# Patient Record
Sex: FEMALE | Race: WHITE | NOT HISPANIC OR LATINO
[De-identification: names, ages, dates, MRNs, and addresses within clinical notes are randomized per-mention and may not be internally consistent; named-entity substitution may affect disease eponyms.]

---

## 2017-04-04 ENCOUNTER — TRANSCRIPTION ENCOUNTER (OUTPATIENT)
Age: 47
End: 2017-04-04

## 2017-09-08 ENCOUNTER — TRANSCRIPTION ENCOUNTER (OUTPATIENT)
Age: 47
End: 2017-09-08

## 2018-09-07 ENCOUNTER — TRANSCRIPTION ENCOUNTER (OUTPATIENT)
Age: 48
End: 2018-09-07

## 2019-05-30 ENCOUNTER — TRANSCRIPTION ENCOUNTER (OUTPATIENT)
Age: 49
End: 2019-05-30

## 2020-01-07 ENCOUNTER — APPOINTMENT (OUTPATIENT)
Dept: OBGYN | Facility: CLINIC | Age: 50
End: 2020-01-07
Payer: COMMERCIAL

## 2020-01-07 VITALS
HEIGHT: 62 IN | WEIGHT: 154 LBS | BODY MASS INDEX: 28.34 KG/M2 | SYSTOLIC BLOOD PRESSURE: 124 MMHG | DIASTOLIC BLOOD PRESSURE: 82 MMHG

## 2020-01-07 DIAGNOSIS — Z80.8 FAMILY HISTORY OF MALIGNANT NEOPLASM OF OTHER ORGANS OR SYSTEMS: ICD-10-CM

## 2020-01-07 PROCEDURE — 99386 PREV VISIT NEW AGE 40-64: CPT

## 2020-01-07 RX ORDER — CITALOPRAM 20 MG/1
20 TABLET, FILM COATED ORAL
Refills: 0 | Status: ACTIVE | COMMUNITY

## 2020-01-07 RX ORDER — CLONIDINE HYDROCHLORIDE 0.1 MG/1
0.1 TABLET ORAL
Refills: 0 | Status: ACTIVE | COMMUNITY

## 2020-01-07 NOTE — CHIEF COMPLAINT
[Initial Visit] : initial GYN visit [FreeTextEntry1] : re establish care\par  with MS and multiple health issues \par She is having multiple menopausal symptoms \par LMP 11/19

## 2020-01-08 LAB — HPV HIGH+LOW RISK DNA PNL CVX: NOT DETECTED

## 2020-01-09 ENCOUNTER — FORM ENCOUNTER (OUTPATIENT)
Age: 50
End: 2020-01-09

## 2020-01-10 ENCOUNTER — APPOINTMENT (OUTPATIENT)
Dept: MAMMOGRAPHY | Facility: CLINIC | Age: 50
End: 2020-01-10
Payer: COMMERCIAL

## 2020-01-10 ENCOUNTER — OUTPATIENT (OUTPATIENT)
Dept: OUTPATIENT SERVICES | Facility: HOSPITAL | Age: 50
LOS: 1 days | End: 2020-01-10
Payer: COMMERCIAL

## 2020-01-10 DIAGNOSIS — Z00.8 ENCOUNTER FOR OTHER GENERAL EXAMINATION: ICD-10-CM

## 2020-01-10 PROCEDURE — 77067 SCR MAMMO BI INCL CAD: CPT | Mod: 26

## 2020-01-10 PROCEDURE — 77063 BREAST TOMOSYNTHESIS BI: CPT | Mod: 26

## 2020-01-10 PROCEDURE — 77063 BREAST TOMOSYNTHESIS BI: CPT

## 2020-01-10 PROCEDURE — 77067 SCR MAMMO BI INCL CAD: CPT

## 2020-01-16 LAB — CYTOLOGY CVX/VAG DOC THIN PREP: NORMAL

## 2020-01-22 ENCOUNTER — TRANSCRIPTION ENCOUNTER (OUTPATIENT)
Age: 50
End: 2020-01-22

## 2020-01-29 ENCOUNTER — APPOINTMENT (OUTPATIENT)
Dept: OBGYN | Facility: CLINIC | Age: 50
End: 2020-01-29

## 2020-01-29 ENCOUNTER — FORM ENCOUNTER (OUTPATIENT)
Age: 50
End: 2020-01-29

## 2020-01-30 ENCOUNTER — OUTPATIENT (OUTPATIENT)
Dept: OUTPATIENT SERVICES | Facility: HOSPITAL | Age: 50
LOS: 1 days | End: 2020-01-30
Payer: COMMERCIAL

## 2020-01-30 ENCOUNTER — APPOINTMENT (OUTPATIENT)
Dept: ULTRASOUND IMAGING | Facility: CLINIC | Age: 50
End: 2020-01-30
Payer: COMMERCIAL

## 2020-01-30 ENCOUNTER — APPOINTMENT (OUTPATIENT)
Dept: MAMMOGRAPHY | Facility: CLINIC | Age: 50
End: 2020-01-30
Payer: COMMERCIAL

## 2020-01-30 DIAGNOSIS — Z00.8 ENCOUNTER FOR OTHER GENERAL EXAMINATION: ICD-10-CM

## 2020-01-30 PROCEDURE — 76641 ULTRASOUND BREAST COMPLETE: CPT | Mod: 26,50

## 2020-01-30 PROCEDURE — G0279: CPT | Mod: 26

## 2020-01-30 PROCEDURE — 77066 DX MAMMO INCL CAD BI: CPT

## 2020-01-30 PROCEDURE — 76641 ULTRASOUND BREAST COMPLETE: CPT

## 2020-01-30 PROCEDURE — G0279: CPT

## 2020-01-30 PROCEDURE — 77066 DX MAMMO INCL CAD BI: CPT | Mod: 26

## 2020-08-03 ENCOUNTER — APPOINTMENT (OUTPATIENT)
Dept: MAMMOGRAPHY | Facility: CLINIC | Age: 50
End: 2020-08-03
Payer: COMMERCIAL

## 2020-08-03 ENCOUNTER — RESULT REVIEW (OUTPATIENT)
Age: 50
End: 2020-08-03

## 2020-08-03 ENCOUNTER — APPOINTMENT (OUTPATIENT)
Dept: ULTRASOUND IMAGING | Facility: CLINIC | Age: 50
End: 2020-08-03
Payer: COMMERCIAL

## 2020-08-03 ENCOUNTER — OUTPATIENT (OUTPATIENT)
Dept: OUTPATIENT SERVICES | Facility: HOSPITAL | Age: 50
LOS: 1 days | End: 2020-08-03
Payer: COMMERCIAL

## 2020-08-03 DIAGNOSIS — Z01.419 ENCOUNTER FOR GYNECOLOGICAL EXAMINATION (GENERAL) (ROUTINE) WITHOUT ABNORMAL FINDINGS: ICD-10-CM

## 2020-08-03 PROCEDURE — G0279: CPT | Mod: 26

## 2020-08-03 PROCEDURE — 76642 ULTRASOUND BREAST LIMITED: CPT | Mod: 26,50

## 2020-08-03 PROCEDURE — 77066 DX MAMMO INCL CAD BI: CPT | Mod: 26

## 2020-08-03 PROCEDURE — G0279: CPT

## 2020-08-03 PROCEDURE — 76642 ULTRASOUND BREAST LIMITED: CPT

## 2020-08-03 PROCEDURE — 77066 DX MAMMO INCL CAD BI: CPT

## 2021-02-17 DIAGNOSIS — R92.2 INCONCLUSIVE MAMMOGRAM: ICD-10-CM

## 2021-02-22 ENCOUNTER — APPOINTMENT (OUTPATIENT)
Dept: MAMMOGRAPHY | Facility: CLINIC | Age: 51
End: 2021-02-22
Payer: COMMERCIAL

## 2021-02-22 ENCOUNTER — OUTPATIENT (OUTPATIENT)
Dept: OUTPATIENT SERVICES | Facility: HOSPITAL | Age: 51
LOS: 1 days | End: 2021-02-22
Payer: COMMERCIAL

## 2021-02-22 ENCOUNTER — APPOINTMENT (OUTPATIENT)
Dept: ULTRASOUND IMAGING | Facility: CLINIC | Age: 51
End: 2021-02-22
Payer: COMMERCIAL

## 2021-02-22 ENCOUNTER — RESULT REVIEW (OUTPATIENT)
Age: 51
End: 2021-02-22

## 2021-02-22 DIAGNOSIS — Z00.8 ENCOUNTER FOR OTHER GENERAL EXAMINATION: ICD-10-CM

## 2021-02-22 DIAGNOSIS — R92.2 INCONCLUSIVE MAMMOGRAM: ICD-10-CM

## 2021-02-22 PROCEDURE — 76641 ULTRASOUND BREAST COMPLETE: CPT | Mod: 26,50

## 2021-02-22 PROCEDURE — 76641 ULTRASOUND BREAST COMPLETE: CPT

## 2021-02-22 PROCEDURE — 77066 DX MAMMO INCL CAD BI: CPT

## 2021-02-22 PROCEDURE — G0279: CPT | Mod: 26

## 2021-02-22 PROCEDURE — G0279: CPT

## 2021-02-22 PROCEDURE — 77066 DX MAMMO INCL CAD BI: CPT | Mod: 26

## 2021-03-16 ENCOUNTER — OUTPATIENT (OUTPATIENT)
Dept: OUTPATIENT SERVICES | Facility: HOSPITAL | Age: 51
LOS: 1 days | End: 2021-03-16
Payer: COMMERCIAL

## 2021-03-16 DIAGNOSIS — Z20.828 CONTACT WITH AND (SUSPECTED) EXPOSURE TO OTHER VIRAL COMMUNICABLE DISEASES: ICD-10-CM

## 2021-03-16 LAB — SARS-COV-2 RNA SPEC QL NAA+PROBE: SIGNIFICANT CHANGE UP

## 2021-03-16 PROCEDURE — U0003: CPT

## 2021-03-16 PROCEDURE — U0005: CPT

## 2021-03-16 PROCEDURE — C9803: CPT

## 2021-03-17 DIAGNOSIS — Z20.828 CONTACT WITH AND (SUSPECTED) EXPOSURE TO OTHER VIRAL COMMUNICABLE DISEASES: ICD-10-CM

## 2021-06-12 ENCOUNTER — INPATIENT (INPATIENT)
Facility: HOSPITAL | Age: 51
LOS: 1 days | Discharge: HOME | DRG: 387 | End: 2021-06-14
Attending: INTERNAL MEDICINE | Admitting: INTERNAL MEDICINE
Payer: COMMERCIAL

## 2021-06-12 VITALS — HEIGHT: 62 IN | WEIGHT: 139.99 LBS

## 2021-06-12 DIAGNOSIS — K51.90 ULCERATIVE COLITIS, UNSPECIFIED, WITHOUT COMPLICATIONS: ICD-10-CM

## 2021-06-12 DIAGNOSIS — R19.7 DIARRHEA, UNSPECIFIED: ICD-10-CM

## 2021-06-12 DIAGNOSIS — Z98.891 HISTORY OF UTERINE SCAR FROM PREVIOUS SURGERY: Chronic | ICD-10-CM

## 2021-06-12 DIAGNOSIS — F41.9 ANXIETY DISORDER, UNSPECIFIED: ICD-10-CM

## 2021-06-12 DIAGNOSIS — K52.9 NONINFECTIVE GASTROENTERITIS AND COLITIS, UNSPECIFIED: ICD-10-CM

## 2021-06-12 DIAGNOSIS — Z29.9 ENCOUNTER FOR PROPHYLACTIC MEASURES, UNSPECIFIED: ICD-10-CM

## 2021-06-12 LAB
APPEARANCE UR: CLEAR — SIGNIFICANT CHANGE UP
BASOPHILS # BLD AUTO: 0.05 K/UL — SIGNIFICANT CHANGE UP (ref 0–0.2)
BASOPHILS NFR BLD AUTO: 0.5 % — SIGNIFICANT CHANGE UP (ref 0–2)
BILIRUB UR-MCNC: NEGATIVE — SIGNIFICANT CHANGE UP
COLOR SPEC: YELLOW — SIGNIFICANT CHANGE UP
DIFF PNL FLD: NEGATIVE — SIGNIFICANT CHANGE UP
EOSINOPHIL # BLD AUTO: 0.36 K/UL — SIGNIFICANT CHANGE UP (ref 0–0.5)
EOSINOPHIL NFR BLD AUTO: 3.4 % — SIGNIFICANT CHANGE UP (ref 0–6)
GLUCOSE UR QL: NEGATIVE MG/DL — SIGNIFICANT CHANGE UP
HCG SERPL-ACNC: 1 MIU/ML — SIGNIFICANT CHANGE UP
HCT VFR BLD CALC: 40.1 % — SIGNIFICANT CHANGE UP (ref 34.5–45)
HGB BLD-MCNC: 13.2 G/DL — SIGNIFICANT CHANGE UP (ref 11.5–15.5)
IMM GRANULOCYTES NFR BLD AUTO: 0.3 % — SIGNIFICANT CHANGE UP (ref 0–1.5)
KETONES UR-MCNC: NEGATIVE — SIGNIFICANT CHANGE UP
LACTATE SERPL-SCNC: 1 MMOL/L — SIGNIFICANT CHANGE UP (ref 0.7–2)
LEUKOCYTE ESTERASE UR-ACNC: ABNORMAL
LIDOCAIN IGE QN: 185 U/L — SIGNIFICANT CHANGE UP (ref 73–393)
LYMPHOCYTES # BLD AUTO: 1.47 K/UL — SIGNIFICANT CHANGE UP (ref 1–3.3)
LYMPHOCYTES # BLD AUTO: 14 % — SIGNIFICANT CHANGE UP (ref 13–44)
MCHC RBC-ENTMCNC: 28.6 PG — SIGNIFICANT CHANGE UP (ref 27–34)
MCHC RBC-ENTMCNC: 32.9 GM/DL — SIGNIFICANT CHANGE UP (ref 32–36)
MCV RBC AUTO: 87 FL — SIGNIFICANT CHANGE UP (ref 80–100)
MONOCYTES # BLD AUTO: 1.1 K/UL — HIGH (ref 0–0.9)
MONOCYTES NFR BLD AUTO: 10.5 % — SIGNIFICANT CHANGE UP (ref 2–14)
NEUTROPHILS # BLD AUTO: 7.47 K/UL — HIGH (ref 1.8–7.4)
NEUTROPHILS NFR BLD AUTO: 71.3 % — SIGNIFICANT CHANGE UP (ref 43–77)
NITRITE UR-MCNC: NEGATIVE — SIGNIFICANT CHANGE UP
PH UR: 7 — SIGNIFICANT CHANGE UP (ref 5–8)
PLATELET # BLD AUTO: 287 K/UL — SIGNIFICANT CHANGE UP (ref 150–400)
PROT UR-MCNC: NEGATIVE MG/DL — SIGNIFICANT CHANGE UP
RBC # BLD: 4.61 M/UL — SIGNIFICANT CHANGE UP (ref 3.8–5.2)
RBC # FLD: 12.3 % — SIGNIFICANT CHANGE UP (ref 10.3–14.5)
SP GR SPEC: 1.01 — SIGNIFICANT CHANGE UP (ref 1.01–1.02)
UROBILINOGEN FLD QL: NEGATIVE MG/DL — SIGNIFICANT CHANGE UP
WBC # BLD: 10.48 K/UL — SIGNIFICANT CHANGE UP (ref 3.8–10.5)
WBC # FLD AUTO: 10.48 K/UL — SIGNIFICANT CHANGE UP (ref 3.8–10.5)

## 2021-06-12 PROCEDURE — 83735 ASSAY OF MAGNESIUM: CPT

## 2021-06-12 PROCEDURE — 86769 SARS-COV-2 COVID-19 ANTIBODY: CPT

## 2021-06-12 PROCEDURE — 71045 X-RAY EXAM CHEST 1 VIEW: CPT | Mod: 26

## 2021-06-12 PROCEDURE — G1004: CPT

## 2021-06-12 PROCEDURE — G0378: CPT

## 2021-06-12 PROCEDURE — 36415 COLL VENOUS BLD VENIPUNCTURE: CPT

## 2021-06-12 PROCEDURE — 93010 ELECTROCARDIOGRAM REPORT: CPT

## 2021-06-12 PROCEDURE — 99223 1ST HOSP IP/OBS HIGH 75: CPT

## 2021-06-12 PROCEDURE — 85652 RBC SED RATE AUTOMATED: CPT

## 2021-06-12 PROCEDURE — 0225U NFCT DS DNA&RNA 21 SARSCOV2: CPT

## 2021-06-12 PROCEDURE — 80048 BASIC METABOLIC PNL TOTAL CA: CPT

## 2021-06-12 PROCEDURE — 99285 EMERGENCY DEPT VISIT HI MDM: CPT

## 2021-06-12 PROCEDURE — 74177 CT ABD & PELVIS W/CONTRAST: CPT | Mod: 26,ME

## 2021-06-12 PROCEDURE — 86140 C-REACTIVE PROTEIN: CPT

## 2021-06-12 RX ORDER — MORPHINE SULFATE 50 MG/1
2 CAPSULE, EXTENDED RELEASE ORAL ONCE
Refills: 0 | Status: DISCONTINUED | OUTPATIENT
Start: 2021-06-12 | End: 2021-06-12

## 2021-06-12 RX ORDER — SODIUM CHLORIDE 9 MG/ML
2000 INJECTION INTRAMUSCULAR; INTRAVENOUS; SUBCUTANEOUS ONCE
Refills: 0 | Status: COMPLETED | OUTPATIENT
Start: 2021-06-12 | End: 2021-06-12

## 2021-06-12 RX ORDER — ONDANSETRON 8 MG/1
4 TABLET, FILM COATED ORAL ONCE
Refills: 0 | Status: COMPLETED | OUTPATIENT
Start: 2021-06-12 | End: 2021-06-12

## 2021-06-12 RX ORDER — MORPHINE SULFATE 50 MG/1
4 CAPSULE, EXTENDED RELEASE ORAL ONCE
Refills: 0 | Status: DISCONTINUED | OUTPATIENT
Start: 2021-06-12 | End: 2021-06-12

## 2021-06-12 RX ORDER — ACETAMINOPHEN 500 MG
1000 TABLET ORAL ONCE
Refills: 0 | Status: COMPLETED | OUTPATIENT
Start: 2021-06-12 | End: 2021-06-12

## 2021-06-12 RX ORDER — SODIUM CHLORIDE 9 MG/ML
1000 INJECTION INTRAMUSCULAR; INTRAVENOUS; SUBCUTANEOUS ONCE
Refills: 0 | Status: COMPLETED | OUTPATIENT
Start: 2021-06-12 | End: 2021-06-12

## 2021-06-12 RX ORDER — ACETAMINOPHEN 500 MG
650 TABLET ORAL EVERY 4 HOURS
Refills: 0 | Status: DISCONTINUED | OUTPATIENT
Start: 2021-06-12 | End: 2021-06-14

## 2021-06-12 RX ORDER — ONDANSETRON 8 MG/1
4 TABLET, FILM COATED ORAL THREE TIMES A DAY
Refills: 0 | Status: DISCONTINUED | OUTPATIENT
Start: 2021-06-12 | End: 2021-06-14

## 2021-06-12 RX ORDER — SODIUM CHLORIDE 9 MG/ML
1000 INJECTION INTRAMUSCULAR; INTRAVENOUS; SUBCUTANEOUS
Refills: 0 | Status: DISCONTINUED | OUTPATIENT
Start: 2021-06-12 | End: 2021-06-14

## 2021-06-12 RX ORDER — CITALOPRAM 10 MG/1
20 TABLET, FILM COATED ORAL DAILY
Refills: 0 | Status: DISCONTINUED | OUTPATIENT
Start: 2021-06-12 | End: 2021-06-14

## 2021-06-12 RX ADMIN — Medication 1000 MILLIGRAM(S): at 18:38

## 2021-06-12 RX ADMIN — SODIUM CHLORIDE 1000 MILLILITER(S): 9 INJECTION INTRAMUSCULAR; INTRAVENOUS; SUBCUTANEOUS at 22:34

## 2021-06-12 RX ADMIN — MORPHINE SULFATE 2 MILLIGRAM(S): 50 CAPSULE, EXTENDED RELEASE ORAL at 23:00

## 2021-06-12 RX ADMIN — MORPHINE SULFATE 2 MILLIGRAM(S): 50 CAPSULE, EXTENDED RELEASE ORAL at 23:40

## 2021-06-12 RX ADMIN — SODIUM CHLORIDE 2000 MILLILITER(S): 9 INJECTION INTRAMUSCULAR; INTRAVENOUS; SUBCUTANEOUS at 18:27

## 2021-06-12 RX ADMIN — MORPHINE SULFATE 4 MILLIGRAM(S): 50 CAPSULE, EXTENDED RELEASE ORAL at 18:39

## 2021-06-12 RX ADMIN — MORPHINE SULFATE 4 MILLIGRAM(S): 50 CAPSULE, EXTENDED RELEASE ORAL at 19:10

## 2021-06-12 RX ADMIN — ONDANSETRON 4 MILLIGRAM(S): 8 TABLET, FILM COATED ORAL at 18:38

## 2021-06-12 RX ADMIN — Medication 1000 MILLIGRAM(S): at 19:10

## 2021-06-12 RX ADMIN — ONDANSETRON 4 MILLIGRAM(S): 8 TABLET, FILM COATED ORAL at 23:00

## 2021-06-12 NOTE — ED PROVIDER NOTE - OBJECTIVE STATEMENT
52 y/o female with PMHx of ulcerative colitis presents to the ED c/o sudden onset of left sided abdominal pain, and 10 of diarrhea since last night. Pt reports she didn't eat anything since last. Pt also reports she had some blood in stool. Pt finished a course of abx 2 weeks ago. Denies dysuria. 50 y/o female with PMHx of ulcerative colitis presents to the ED c/o sudden onset of left sided abdominal pain, and diarrhea since last night. Pt reports she didn't eat anything due to nausea. Pt also reports she had some blood in stool. Pt finished a course of abx 2 weeks ago. Denies dysuria, hematuria or frequency. No recent exotic travel. No IVDU, ETOH abuse. no recent trauma.

## 2021-06-12 NOTE — H&P ADULT - PROBLEM SELECTOR PLAN 1
CT AP demonstrated mild diffuse thickening of the colon wall consistent with the provided history of ulcerative colitis  No history of severe UC complications or flare   Check CMP, CRP and ESR   Serial abdominal exams   monitor I and Os   Follow up c diff and stool studies, isolation precautions in place   Follow up blood cultures   Lactate wnl   s/p 3 L of NS in the ED, cont IVF   Pending result of infectious workup- would obtain GI consult   NPO for now, increase diet as tolerated  Monitor fever curve CT AP demonstrated mild diffuse thickening of the colon wall consistent with the provided history of ulcerative colitis  No history of severe UC complications or flare   Check CMP, CRP and ESR   Serial abdominal exams    start cipor flagyl for possible bacterial colitis, pending results of c diff would start oral vancomycin   monitor I and Os   Follow up c diff and stool studies, isolation precautions in place   Follow up blood cultures   Lactate wnl   s/p 3 L of NS in the ED, cont IVF   Pending result of infectious workup- would obtain GI consult   NPO for now, increase diet as tolerated  Monitor fever curve

## 2021-06-12 NOTE — H&P ADULT - PROBLEM SELECTOR PLAN 3
followed by Dr. Del Valle at [facility], will contact for collateral informaiton   Not currently on meds   Consider mesalamine suppository to start treatment for UC flare followed by Dr. Del Valle at [facility], will contact for collateral information   Not currently on meds

## 2021-06-12 NOTE — H&P ADULT - ASSESSMENT
52 y/o female with PMHx of ulcerative colitis  and recent antibiotic use presents to the ED c/o sudden onset of left sided abdominal pain and acute severe diarrhea

## 2021-06-12 NOTE — H&P ADULT - HISTORY OF PRESENT ILLNESS
52 y/o female with PMHx of ulcerative colitis presents to the ED c/o sudden onset of left sided abdominal pain, and 10 -12 episodes of diarrhea (watery and brown, some intermittent blood) since last night. Patient also repots some nausea and reported vomiting x1. Pt also reports she had some blood in stool. Denies any fever, rash, new foods, and problem with urination CP or SOB.  Pt finished a course of antibiotics for an upper respiratory infection about two week ago but is unable to recall the name of the antibiotic she took   Patient report she was diagnosed with UC 16 years ago and is followed by Dr. Del Valle at [facility]. Reports her last flare was 2020, last clonoscopy was 2020 and was treated as an outpatient mesalamine. Denies history hospitalizations or serve complications due to UC.  In the ED, VS  were Temp 36.9C HR: 104 BP: 119/73 R; 18 pO2:100% on RA. Labs were notable for CBC and coags wnl. Lactate 1.0. Lipase 185. UA unremarkable. Blood, urine and stool studies were sent. Imaging included CT AP which demonstrated mild diffuse thickening of the colon wall consistent with the provided history of ulcerative colitis. Patient was give IVFs, antiemetic and pain management in the ED. Patient was admitted to medicine for further management.   50 y/o female with PMHx of ulcerative colitis presents to the ED c/o sudden onset of left sided abdominal pain, and 10 -12 episodes of diarrhea (watery and brown, some intermittent blood) since last night. Patient also repots some nausea and reported vomiting x1. Pt also reports she had some blood in stool. Denies any fever, rash, new foods, and problem with urination CP or SOB.  Pt finished a course of antibiotics for an upper respiratory infection about two week ago but is unable to recall the name of the antibiotic she took.   Patient report she was diagnosed with UC 16 years ago and is followed by Dr. Del Valle at [facility]. Reports her last flare was 2020, last clonoscopy was 2020 and was treated as an outpatient mesalamine. Denies history hospitalizations or serve complications due to UC. This is the worse her flares have been.  In the ED, VS  were Temp 36.9C HR: 104 BP: 119/73 R; 18 pO2:100% on RA. Labs were notable for CBC and coags wnl. Lactate 1.0. Lipase 185. UA unremarkable. Blood, urine and stool studies were sent. Imaging included CT AP which demonstrated mild diffuse thickening of the colon wall consistent with the provided history of ulcerative colitis. Patient was give IVFs, antiemetic and pain management in the ED. Patient was admitted to medicine for further management.

## 2021-06-12 NOTE — ED PROVIDER NOTE - NS_ ATTENDINGSCRIBEDETAILS _ED_A_ED_FT
I Kyle Magaña MD saw and examined the patient. Scribe documented for me and under my supervision. I have modified the scribe's documentation where necessary to reflect my history, physical exam and other relevant documentations pertinent to the care of the patient.

## 2021-06-12 NOTE — ED STATDOCS - PROGRESS NOTE DETAILS
Progress: Scribe PS for ED attending, Dr. Cruz: 50 y/o female presents to the ED 10 watery BM today, recently finished abx, concerned for C-diff. Will sent to main. also with abd pain that feels like prior uc flare

## 2021-06-12 NOTE — ED PROVIDER NOTE - PROGRESS NOTE DETAILS
Scribe PS for ED attending, Dr. Cruz: 52 y/o female presents to the ED 10 watery BM today, recently finished abx, concerned for C-diff. Will sent to main.
Gómez PERLA: Updated patient with the results of the labs and imaging. Patient with diarrhea, unable to fully tolerate PO, possible UC flair up. will admit.

## 2021-06-12 NOTE — ED ADULT NURSE NOTE - OBJECTIVE STATEMENT
Patient has a hx of Ulcerative colitis and started to have a flare up in mid May.  Patient states she had a shingle shot, and a sinus infection and antibiotics.  Patient then used her melasamine enema and it seemed to help at that time.  Patient having urgency and increased diarrhea worsening over past 48 hours. More than 10 episodes in a day.

## 2021-06-12 NOTE — ED ADULT TRIAGE NOTE - CHIEF COMPLAINT QUOTE
Pt. to the ED C/O Left Side Abdominal Pain - Pt. states she is having a UC Flare Up- Reports small Rectal Bleeding

## 2021-06-12 NOTE — ED PROVIDER NOTE - CHIEF COMPLAINT
The patient is a 51y Female complaining of abdominal pain.
The patient is a 51y Female complaining of abdominal pain.

## 2021-06-12 NOTE — ED PROVIDER NOTE - NEUROLOGICAL, MLM
Alert and oriented, no focal deficits, no motor or sensory deficits, NIH:0, GCS:15, 5/5 strength in all 4 extremities.

## 2021-06-12 NOTE — H&P ADULT - NSHPSOURCEINFORD_GEN_ALL_CORE
I will START or STAY ON the medications listed below when I get home from the hospital:    Percocet 5/325 oral tablet  -- 1 tab(s) by mouth every 6 hours, As Needed MDD:8  -- Caution federal law prohibits the transfer of this drug to any person other  than the person for whom it was prescribed.  May cause drowsiness.  Alcohol may intensify this effect.  Use care when operating dangerous machinery.  This prescription cannot be refilled.  This product contains acetaminophen.  Do not use  with any other product containing acetaminophen to prevent possible liver damage.  Using more of this medication than prescribed may cause serious breathing problems.    -- Indication: For Pain    famotidine 20 mg oral tablet  -- 1 tab(s) by mouth night before and am of procedure  -- Indication: For Home med    ferrous sulfate  -- 1 tab(s) by mouth once a day  -- Indication: For Home med    levothyroxine 50 mcg (0.05 mg) oral tablet  -- 1 tab(s) by mouth once a day  -- Indication: For Home med Chart(s)

## 2021-06-12 NOTE — ED ADULT NURSE NOTE - NSIMPLEMENTINTERV_GEN_ALL_ED
Implemented All Fall Risk Interventions:  [place] to call system. Call bell, personal items and telephone within reach. Instruct patient to call for assistance. Room bathroom lighting operational. Non-slip footwear when patient is off stretcher. Physically safe environment: no spills, clutter or unnecessary equipment. Stretcher in lowest position, wheels locked, appropriate side rails in place. Provide visual cue, wrist band, yellow gown, etc. Monitor gait and stability. Monitor for mental status changes and reorient to person, place, and time. Review medications for side effects contributing to fall risk. Reinforce activity limits and safety measures with patient and family.

## 2021-06-13 LAB
ALBUMIN SERPL ELPH-MCNC: 3.3 G/DL — SIGNIFICANT CHANGE UP (ref 3.3–5)
ALP SERPL-CCNC: 113 U/L — SIGNIFICANT CHANGE UP (ref 40–120)
ALT FLD-CCNC: 61 U/L — SIGNIFICANT CHANGE UP (ref 12–78)
ANION GAP SERPL CALC-SCNC: 5 MMOL/L — SIGNIFICANT CHANGE UP (ref 5–17)
ANION GAP SERPL CALC-SCNC: 6 MMOL/L — SIGNIFICANT CHANGE UP (ref 5–17)
AST SERPL-CCNC: 34 U/L — SIGNIFICANT CHANGE UP (ref 15–37)
BILIRUB SERPL-MCNC: 0.5 MG/DL — SIGNIFICANT CHANGE UP (ref 0.2–1.2)
BUN SERPL-MCNC: 5 MG/DL — LOW (ref 7–23)
BUN SERPL-MCNC: 8 MG/DL — SIGNIFICANT CHANGE UP (ref 7–23)
C DIFF BY PCR RESULT: SIGNIFICANT CHANGE UP
C DIFF TOX GENS STL QL NAA+PROBE: SIGNIFICANT CHANGE UP
CALCIUM SERPL-MCNC: 9 MG/DL — SIGNIFICANT CHANGE UP (ref 8.5–10.1)
CALCIUM SERPL-MCNC: 9.2 MG/DL — SIGNIFICANT CHANGE UP (ref 8.5–10.1)
CHLORIDE SERPL-SCNC: 104 MMOL/L — SIGNIFICANT CHANGE UP (ref 96–108)
CHLORIDE SERPL-SCNC: 111 MMOL/L — HIGH (ref 96–108)
CO2 SERPL-SCNC: 25 MMOL/L — SIGNIFICANT CHANGE UP (ref 22–31)
CO2 SERPL-SCNC: 26 MMOL/L — SIGNIFICANT CHANGE UP (ref 22–31)
COVID-19 SPIKE DOMAIN AB INTERP: POSITIVE
COVID-19 SPIKE DOMAIN ANTIBODY RESULT: >250 U/ML — HIGH
CREAT SERPL-MCNC: 0.51 MG/DL — SIGNIFICANT CHANGE UP (ref 0.5–1.3)
CREAT SERPL-MCNC: 0.62 MG/DL — SIGNIFICANT CHANGE UP (ref 0.5–1.3)
CRP SERPL-MCNC: 19 MG/L — HIGH
CULTURE RESULTS: SIGNIFICANT CHANGE UP
ERYTHROCYTE [SEDIMENTATION RATE] IN BLOOD: 25 MM/HR — HIGH (ref 0–20)
GLUCOSE SERPL-MCNC: 115 MG/DL — HIGH (ref 70–99)
GLUCOSE SERPL-MCNC: 86 MG/DL — SIGNIFICANT CHANGE UP (ref 70–99)
MAGNESIUM SERPL-MCNC: 1.6 MG/DL — SIGNIFICANT CHANGE UP (ref 1.6–2.6)
POTASSIUM SERPL-MCNC: 3.5 MMOL/L — SIGNIFICANT CHANGE UP (ref 3.5–5.3)
POTASSIUM SERPL-MCNC: 3.6 MMOL/L — SIGNIFICANT CHANGE UP (ref 3.5–5.3)
POTASSIUM SERPL-SCNC: 3.5 MMOL/L — SIGNIFICANT CHANGE UP (ref 3.5–5.3)
POTASSIUM SERPL-SCNC: 3.6 MMOL/L — SIGNIFICANT CHANGE UP (ref 3.5–5.3)
PROT SERPL-MCNC: 7.2 GM/DL — SIGNIFICANT CHANGE UP (ref 6–8.3)
RAPID RVP RESULT: SIGNIFICANT CHANGE UP
SARS-COV-2 IGG+IGM SERPL QL IA: >250 U/ML — HIGH
SARS-COV-2 IGG+IGM SERPL QL IA: POSITIVE
SARS-COV-2 RNA SPEC QL NAA+PROBE: SIGNIFICANT CHANGE UP
SODIUM SERPL-SCNC: 136 MMOL/L — SIGNIFICANT CHANGE UP (ref 135–145)
SODIUM SERPL-SCNC: 141 MMOL/L — SIGNIFICANT CHANGE UP (ref 135–145)
SPECIMEN SOURCE: SIGNIFICANT CHANGE UP

## 2021-06-13 PROCEDURE — 99223 1ST HOSP IP/OBS HIGH 75: CPT

## 2021-06-13 PROCEDURE — 99233 SBSQ HOSP IP/OBS HIGH 50: CPT

## 2021-06-13 RX ORDER — PROCHLORPERAZINE MALEATE 5 MG
5 TABLET ORAL ONCE
Refills: 0 | Status: COMPLETED | OUTPATIENT
Start: 2021-06-13 | End: 2021-06-13

## 2021-06-13 RX ORDER — CIPROFLOXACIN LACTATE 400MG/40ML
400 VIAL (ML) INTRAVENOUS EVERY 12 HOURS
Refills: 0 | Status: DISCONTINUED | OUTPATIENT
Start: 2021-06-13 | End: 2021-06-13

## 2021-06-13 RX ORDER — METRONIDAZOLE 500 MG
500 TABLET ORAL ONCE
Refills: 0 | Status: COMPLETED | OUTPATIENT
Start: 2021-06-13 | End: 2021-06-13

## 2021-06-13 RX ORDER — METRONIDAZOLE 500 MG
TABLET ORAL
Refills: 0 | Status: DISCONTINUED | OUTPATIENT
Start: 2021-06-13 | End: 2021-06-13

## 2021-06-13 RX ORDER — ENOXAPARIN SODIUM 100 MG/ML
40 INJECTION SUBCUTANEOUS AT BEDTIME
Refills: 0 | Status: DISCONTINUED | OUTPATIENT
Start: 2021-06-13 | End: 2021-06-14

## 2021-06-13 RX ORDER — LANOLIN ALCOHOL/MO/W.PET/CERES
3 CREAM (GRAM) TOPICAL ONCE
Refills: 0 | Status: COMPLETED | OUTPATIENT
Start: 2021-06-13 | End: 2021-06-13

## 2021-06-13 RX ORDER — METRONIDAZOLE 500 MG
500 TABLET ORAL EVERY 8 HOURS
Refills: 0 | Status: DISCONTINUED | OUTPATIENT
Start: 2021-06-13 | End: 2021-06-13

## 2021-06-13 RX ORDER — PIPERACILLIN AND TAZOBACTAM 4; .5 G/20ML; G/20ML
3.38 INJECTION, POWDER, LYOPHILIZED, FOR SOLUTION INTRAVENOUS EVERY 8 HOURS
Refills: 0 | Status: DISCONTINUED | OUTPATIENT
Start: 2021-06-13 | End: 2021-06-14

## 2021-06-13 RX ADMIN — Medication 3 MILLIGRAM(S): at 21:36

## 2021-06-13 RX ADMIN — ONDANSETRON 4 MILLIGRAM(S): 8 TABLET, FILM COATED ORAL at 13:34

## 2021-06-13 RX ADMIN — Medication 100 MILLIGRAM(S): at 07:51

## 2021-06-13 RX ADMIN — PIPERACILLIN AND TAZOBACTAM 25 GRAM(S): 4; .5 INJECTION, POWDER, LYOPHILIZED, FOR SOLUTION INTRAVENOUS at 21:36

## 2021-06-13 RX ADMIN — ENOXAPARIN SODIUM 40 MILLIGRAM(S): 100 INJECTION SUBCUTANEOUS at 21:36

## 2021-06-13 RX ADMIN — Medication 0.2 MILLIGRAM(S): at 21:36

## 2021-06-13 RX ADMIN — Medication 5 MILLIGRAM(S): at 02:06

## 2021-06-13 RX ADMIN — Medication 650 MILLIGRAM(S): at 02:11

## 2021-06-13 RX ADMIN — CITALOPRAM 20 MILLIGRAM(S): 10 TABLET, FILM COATED ORAL at 10:27

## 2021-06-13 RX ADMIN — SODIUM CHLORIDE 100 MILLILITER(S): 9 INJECTION INTRAMUSCULAR; INTRAVENOUS; SUBCUTANEOUS at 03:02

## 2021-06-13 RX ADMIN — PIPERACILLIN AND TAZOBACTAM 25 GRAM(S): 4; .5 INJECTION, POWDER, LYOPHILIZED, FOR SOLUTION INTRAVENOUS at 13:32

## 2021-06-13 RX ADMIN — Medication 100 MILLIGRAM(S): at 01:43

## 2021-06-13 RX ADMIN — Medication 20 MILLIGRAM(S): at 13:32

## 2021-06-13 RX ADMIN — Medication 200 MILLIGRAM(S): at 00:43

## 2021-06-13 NOTE — CONSULT NOTE ADULT - SUBJECTIVE AND OBJECTIVE BOX
Patient is a 51y old  Female who presents with a chief complaint of Diarrhea (2021 23:29)      HPI:  52 y/o female with PMHx of ulcerative colitis presents to the ED c/o sudden onset of left sided abdominal pain, and 10 -12 episodes of diarrhea (watery and brown, some intermittent blood) night prior to admission. Patient also reports some nausea/vomiting, and scant rectal bleedingl. Denies any fever, rash, new foods, and problem with urination CP or SOB.  Pt finished a course of antibiotics for an upper respiratory infection about two week ago  Patient report she was diagnosed with UC 16 years ago and is followed by Dr. Harrington at [facility]. Reports her last flare was , last colonoscopy was  and was treated as an outpatient mesalamine. Denies history hospitalizations or serve complications due to UC. This is the worse her symptoms have been. She has not been on steroids ever before for her disease tends to be managed with topical mesalamine as needed, though she has tried oral mesalamine in the past   Blood, urine and stool studies were sent. Imaging included CT AP which demonstrated mild diffuse thickening of the colon wall consistent with the provided history of ulcerative colitis. Patient was give IVFs, antiemetic and pain management in the ED.   This AM she is feeling a little better with decreased discomfort, with fewer BM and no bleeding    PAST MEDICAL & SURGICAL HISTORY:  Ulcerative colitis  H/O  section    MEDICATIONS  (STANDING):  ciprofloxacin   IVPB 400 milliGRAM(s) IV Intermittent every 12 hours  citalopram 20 milliGRAM(s) Oral daily  cloNIDine 0.2 milliGRAM(s) Oral at bedtime  metroNIDAZOLE  IVPB      metroNIDAZOLE  IVPB 500 milliGRAM(s) IV Intermittent every 8 hours  sodium chloride 0.9%. 1000 milliLiter(s) (100 mL/Hr) IV Continuous <Continuous>    MEDICATIONS  (PRN):  acetaminophen   Tablet .. 650 milliGRAM(s) Oral every 4 hours PRN Mild Pain (1 - 3)  ondansetron Injectable 4 milliGRAM(s) IV Push three times a day PRN Nausea and/or Vomiting    Allergies  No Known Allergies    SOCIAL HISTORY: occ etoh,    FAMILY HISTORY:  Family history of malignant neoplasm of parotid gland        REVIEW OF SYSTEMS:    CONSTITUTIONAL: as above  EYES/ENT: No visual changes;  No vertigo or throat pain   NECK: No pain or stiffness  RESPIRATORY: No cough, wheezing, hemoptysis; No shortness of breath  CARDIOVASCULAR: No chest pain or palpitations  GASTROINTESTINAL: as above  GENITOURINARY: No dysuria, frequency or hematuria  NEUROLOGICAL: No numbness or weakness  SKIN: No itching, burning, rashes, or lesions   PSYCH: Normal mood and affect  All other review of systems is negative unless indicated above.    Vital Signs Last 24 Hrs  T(C): 36.5 (2021 05:47), Max: 37.2 (2021 04:40)  T(F): 97.7 (2021 05:47), Max: 98.9 (2021 04:40)  HR: 85 (2021 08:00) (85 - 125)  BP: 135/69 (2021 08:00) (119/73 - 143/82)  BP(mean): 85 (2021 08:00) (70 - 99)  RR: 25 (2021 08:00) (18 - 25)  SpO2: 97% (2021 08:00) (97% - 100%)    PHYSICAL EXAM:    Constitutional: NAD  HEENT: MMM  Neck: No LAD  Respiratory: CTAB  Cardiovascular: S1 and S2, RRR, no M/G/R  Gastrointestinal: BS+, soft, mildly tender llq > rlq  Extremities: No peripheral edema  Vascular: 2+ peripheral pulses  Neurological: A/O x 3, no focal deficits  Psychiatric: Normal mood, normal affect  Skin: No rashes    LABS:                        13.2   10.48 )-----------( 287      ( 2021 18:22 )             40.1     06-12    136  |  104  |  8   ----------------------------<  86  3.5   |  26  |  0.62    Ca    9.2      2021 18:22    TPro  7.2  /  Alb  3.3  /  TBili  0.5  /  DBili  x   /  AST  34  /  ALT  61  /  AlkPhos  113  06-12      LIVER FUNCTIONS - ( 2021 18:22 )  Alb: 3.3 g/dL / Pro: 7.2 gm/dL / ALK PHOS: 113 U/L / ALT: 61 U/L / AST: 34 U/L / GGT: x             RADIOLOGY & ADDITIONAL STUDIES:

## 2021-06-13 NOTE — PROGRESS NOTE ADULT - SUBJECTIVE AND OBJECTIVE BOX
52 y/o female with PMHx of ulcerative colitis presents to the ED c/o sudden onset of left sided abdominal pain, and 10 -12 episodes of diarrhea (watery and brown, some intermittent blood) night prior to admission. Patient also reports some nausea/vomiting, and scant rectal bleedingl. Denies any fever, rash, new foods, and problem with urination CP or SOB.  Pt finished a course of antibiotics for an upper respiratory infection about two week ago. Blood, urine and stool studies were sent. Imaging included CT AP which demonstrated mild diffuse thickening of the colon wall consistent with the provided history of ulcerative colitis. Patient was give IVFs, antiemetic and pain management in the ED.   This AM she is feeling a little better with decreased discomfort, with fewer BM and no bleeding  c/o arthralgias , muscle pain lower extr    Vital Signs Last 24 Hrs  T(C): 37.3 (13 Jun 2021 09:40), Max: 37.3 (13 Jun 2021 09:40)  T(F): 99.2 (13 Jun 2021 09:40), Max: 99.2 (13 Jun 2021 09:40)  HR: 116 (13 Jun 2021 10:00) (85 - 125)  BP: 141/78 (13 Jun 2021 10:00) (119/73 - 143/82)  BP(mean): 95 (13 Jun 2021 10:00) (70 - 99)  RR: 19 (13 Jun 2021 10:00) (18 - 25)  SpO2: 96% (13 Jun 2021 10:00) (96% - 100%)  CARDIOVASCULAR: No chest pain or palpitations  GASTROINTESTINAL: as above  GENITOURINARY: No dysuria, frequency or hematuria  NEUROLOGICAL: No numbness or weakness  SKIN: No itching, burning, rashes, or lesions   PSYCH: Normal mood and affect  All other review of systems is negative unless indicated above.      Constitutional: NAD  HEENT: MMM  Neck: No LAD  Respiratory: CTAB  Cardiovascular: S1 and S2, RRR, no M/G/R  Gastrointestinal: BS+, soft, mildly tender llq > rlq  Extremities: No peripheral edema  Vascular: 2+ peripheral pulses  Neurological: A/O x 3, no focal deficits  Psychiatric: Normal mood, normal affect  Skin: No rashes    LABS:                        13.2   10.48 )-----------( 287      ( 12 Jun 2021 18:22 )             40.1     06-12    136  |  104  |  8   ----------------------------<  86  3.5   |  26  |  0.62    Ca    9.2      12 Jun 2021 18:22    TPro  7.2  /  Alb  3.3  /  TBili  0.5  /  DBili  x   /  AST  34  /  ALT  61  /  AlkPhos  113  06-12      LIVER FUNCTIONS - ( 12 Jun 2021 18:22 )  Alb: 3.3 g/dL / Pro: 7.2 gm/dL / ALK PHOS: 113 U/L / ALT: 61 U/L / AST: 34 U/L / GGT: x             * IBD flare up  change to Zoxyn to avoid Cipro  one dose steroids for the arthlagia, muscle pain  BMP normal  pending C diff

## 2021-06-13 NOTE — CONSULT NOTE ADULT - ASSESSMENT
52yo female with UC with increased diarrhea    Await cdiff given recent abx  continue abx  start clears  will hold off on steroids as unsure if flare  will need eventual colonoscopy if symptoms persist, though may be as outpt

## 2021-06-14 ENCOUNTER — TRANSCRIPTION ENCOUNTER (OUTPATIENT)
Age: 51
End: 2021-06-14

## 2021-06-14 VITALS — DIASTOLIC BLOOD PRESSURE: 84 MMHG | HEART RATE: 96 BPM | SYSTOLIC BLOOD PRESSURE: 136 MMHG | RESPIRATION RATE: 18 BRPM

## 2021-06-14 PROCEDURE — 99239 HOSP IP/OBS DSCHRG MGMT >30: CPT

## 2021-06-14 RX ORDER — MESALAMINE 400 MG
1 TABLET, DELAYED RELEASE (ENTERIC COATED) ORAL
Qty: 30 | Refills: 0
Start: 2021-06-14 | End: 2021-07-13

## 2021-06-14 RX ADMIN — SODIUM CHLORIDE 100 MILLILITER(S): 9 INJECTION INTRAMUSCULAR; INTRAVENOUS; SUBCUTANEOUS at 05:48

## 2021-06-14 RX ADMIN — CITALOPRAM 20 MILLIGRAM(S): 10 TABLET, FILM COATED ORAL at 09:46

## 2021-06-14 RX ADMIN — PIPERACILLIN AND TAZOBACTAM 25 GRAM(S): 4; .5 INJECTION, POWDER, LYOPHILIZED, FOR SOLUTION INTRAVENOUS at 05:32

## 2021-06-14 NOTE — DISCHARGE NOTE PROVIDER - HOSPITAL COURSE
52 y/o female with PMHx of ulcerative colitis presents to the ED c/o sudden onset of left sided abdominal pain, and 10 -12 episodes of diarrhea (watery and brown, some intermittent blood) night prior to admission. Patient also reports some nausea/vomiting, and scant rectal bleeding. Denies any fever, rash, new foods, and problem with urination CP or SOB.  Pt finished a course of antibiotics for an upper respiratory infection about two week ago. Blood, urine and stool studies were sent. Imaging included CT AP which demonstrated mild diffuse thickening of the colon wall consistent with the provided history of ulcerative colitis. Patient was give IVFs, antiemetic and pain management in the ED.   1bm today      CARDIOVASCULAR: No chest pain or palpitations  GASTROINTESTINAL: as above  GENITOURINARY: No dysuria, frequency or hematuria  NEUROLOGICAL: No numbness or weakness  SKIN: No itching, burning, rashes, or lesions   PSYCH: Normal mood and affect  All other review of systems is negative unless indicated above.      Constitutional: NAD  HEENT: MMM  Neck: No LAD  Respiratory: CTAB  Cardiovascular: S1 and S2, RRR, no M/G/R  Gastrointestinal: BS+, soft, mildly tender llq > rlq  Extremities: No peripheral edema  Vascular: 2+ peripheral pulses  Neurological: A/O x 3, no focal deficits  Psychiatric: Normal mood, normal affect  Skin: No rashes            * IBD flare up  po Augmentin  add Meselamine- takes intermittently would like a Rx to have at home  has appt with her GI in 3 D  I faxed a Rx for Mesalamine to [facility] she tells me she ran out and will use it as prescribed by her GI

## 2021-06-14 NOTE — DISCHARGE NOTE PROVIDER - NSDCFUSCHEDAPPT_GEN_ALL_CORE_FT
ANNAMARIE CHANDLER ; 08/23/2021 ; [facility] [address]  ANNAMARIE CHANDLER ; 08/23/2021 ; [facility]  [address]

## 2021-06-14 NOTE — DISCHARGE NOTE PROVIDER - NSDCCPCAREPLAN_GEN_ALL_CORE_FT
PRINCIPAL DISCHARGE DIAGNOSIS  Diagnosis: Colitis  Assessment and Plan of Treatment: resolving; see GI doc as scheduled; I faxed Meselamine Rx use it as directed by your GI doc      SECONDARY DISCHARGE DIAGNOSES  Diagnosis: Diarrhea, unspecified type  Assessment and Plan of Treatment:

## 2021-06-14 NOTE — DISCHARGE NOTE PROVIDER - CARE PROVIDER_API CALL
Fifi Markham J  INTERNAL MEDICINE  [address] [place] [address]  Phone: [phone]  Fax: [phone]  Follow Up Time:

## 2021-06-14 NOTE — DISCHARGE NOTE NURSING/CASE MANAGEMENT/SOCIAL WORK - PATIENT PORTAL LINK FT
You can access the FollowMyHealth Patient Portal offered by [facility] by registering at the following website: [email or web address]. By joining [facility]’s FollowMyHealth portal, you will also be able to view your health information using other applications (apps) compatible with our system.

## 2021-06-18 LAB
CULTURE RESULTS: SIGNIFICANT CHANGE UP
SPECIMEN SOURCE: SIGNIFICANT CHANGE UP

## 2021-06-23 ENCOUNTER — EMERGENCY (EMERGENCY)
Facility: HOSPITAL | Age: 51
LOS: 1 days | Discharge: HOME | End: 2021-06-23
Attending: EMERGENCY MEDICINE
Payer: COMMERCIAL

## 2021-06-23 VITALS
DIASTOLIC BLOOD PRESSURE: 82 MMHG | HEART RATE: 130 BPM | TEMPERATURE: 98 F | SYSTOLIC BLOOD PRESSURE: 116 MMHG | RESPIRATION RATE: 16 BRPM | HEIGHT: 62 IN | WEIGHT: 130.07 LBS | OXYGEN SATURATION: 96 %

## 2021-06-23 DIAGNOSIS — Z98.891 HISTORY OF UTERINE SCAR FROM PREVIOUS SURGERY: Chronic | ICD-10-CM

## 2021-06-23 PROBLEM — K51.90 ULCERATIVE COLITIS, UNSPECIFIED, WITHOUT COMPLICATIONS: Chronic | Status: ACTIVE | Noted: 2021-06-12

## 2021-06-23 LAB
ALBUMIN SERPL ELPH-MCNC: 3.1 G/DL — LOW (ref 3.3–5)
ALBUMIN SERPL ELPH-MCNC: 3.5 G/DL — SIGNIFICANT CHANGE UP (ref 3.3–5)
ALP SERPL-CCNC: 112 U/L — SIGNIFICANT CHANGE UP (ref 40–120)
ALP SERPL-CCNC: 124 U/L — HIGH (ref 40–120)
ALT FLD-CCNC: 18 U/L — SIGNIFICANT CHANGE UP (ref 10–45)
ALT FLD-CCNC: 22 U/L — SIGNIFICANT CHANGE UP (ref 10–45)
ANION GAP SERPL CALC-SCNC: 15 MMOL/L — SIGNIFICANT CHANGE UP (ref 5–17)
ANION GAP SERPL CALC-SCNC: 17 MMOL/L — SIGNIFICANT CHANGE UP (ref 5–17)
AST SERPL-CCNC: 11 U/L — SIGNIFICANT CHANGE UP (ref 10–40)
AST SERPL-CCNC: 17 U/L — SIGNIFICANT CHANGE UP (ref 10–40)
BASE EXCESS BLDV CALC-SCNC: 0.1 MMOL/L — SIGNIFICANT CHANGE UP (ref -2–2)
BASOPHILS # BLD AUTO: 0 K/UL — SIGNIFICANT CHANGE UP (ref 0–0.2)
BASOPHILS # BLD AUTO: 0.08 K/UL — SIGNIFICANT CHANGE UP (ref 0–0.2)
BASOPHILS NFR BLD AUTO: 0 % — SIGNIFICANT CHANGE UP (ref 0–2)
BASOPHILS NFR BLD AUTO: 0.7 % — SIGNIFICANT CHANGE UP (ref 0–2)
BILIRUB SERPL-MCNC: 0.5 MG/DL — SIGNIFICANT CHANGE UP (ref 0.2–1.2)
BILIRUB SERPL-MCNC: 0.6 MG/DL — SIGNIFICANT CHANGE UP (ref 0.2–1.2)
BUN SERPL-MCNC: 12 MG/DL — SIGNIFICANT CHANGE UP (ref 7–23)
BUN SERPL-MCNC: 13 MG/DL — SIGNIFICANT CHANGE UP (ref 7–23)
CA-I SERPL-SCNC: 1.17 MMOL/L — SIGNIFICANT CHANGE UP (ref 1.12–1.3)
CALCIUM SERPL-MCNC: 8.9 MG/DL — SIGNIFICANT CHANGE UP (ref 8.4–10.5)
CALCIUM SERPL-MCNC: 9.3 MG/DL — SIGNIFICANT CHANGE UP (ref 8.4–10.5)
CHLORIDE BLDV-SCNC: 108 MMOL/L — SIGNIFICANT CHANGE UP (ref 96–108)
CHLORIDE SERPL-SCNC: 104 MMOL/L — SIGNIFICANT CHANGE UP (ref 96–108)
CHLORIDE SERPL-SCNC: 106 MMOL/L — SIGNIFICANT CHANGE UP (ref 96–108)
CO2 BLDV-SCNC: 25 MMOL/L — SIGNIFICANT CHANGE UP (ref 22–30)
CO2 SERPL-SCNC: 18 MMOL/L — LOW (ref 22–31)
CO2 SERPL-SCNC: 19 MMOL/L — LOW (ref 22–31)
CREAT SERPL-MCNC: 0.45 MG/DL — LOW (ref 0.5–1.3)
CREAT SERPL-MCNC: 0.49 MG/DL — LOW (ref 0.5–1.3)
EOSINOPHIL # BLD AUTO: 0.17 K/UL — SIGNIFICANT CHANGE UP (ref 0–0.5)
EOSINOPHIL # BLD AUTO: 0.29 K/UL — SIGNIFICANT CHANGE UP (ref 0–0.5)
EOSINOPHIL NFR BLD AUTO: 1.4 % — SIGNIFICANT CHANGE UP (ref 0–6)
EOSINOPHIL NFR BLD AUTO: 2.6 % — SIGNIFICANT CHANGE UP (ref 0–6)
GAS PNL BLDV: 134 MMOL/L — LOW (ref 135–145)
GAS PNL BLDV: SIGNIFICANT CHANGE UP
GAS PNL BLDV: SIGNIFICANT CHANGE UP
GLUCOSE BLDV-MCNC: 134 MG/DL — HIGH (ref 70–99)
GLUCOSE SERPL-MCNC: 103 MG/DL — HIGH (ref 70–99)
GLUCOSE SERPL-MCNC: 126 MG/DL — HIGH (ref 70–99)
HCG SERPL-ACNC: <2 MIU/ML — SIGNIFICANT CHANGE UP
HCO3 BLDV-SCNC: 24 MMOL/L — SIGNIFICANT CHANGE UP (ref 21–29)
HCT VFR BLD CALC: 39.2 % — SIGNIFICANT CHANGE UP (ref 34.5–45)
HCT VFR BLD CALC: 42.9 % — SIGNIFICANT CHANGE UP (ref 34.5–45)
HCT VFR BLDA CALC: 46 % — SIGNIFICANT CHANGE UP (ref 39–50)
HGB BLD CALC-MCNC: 14.9 G/DL — SIGNIFICANT CHANGE UP (ref 11.5–15.5)
HGB BLD-MCNC: 12.7 G/DL — SIGNIFICANT CHANGE UP (ref 11.5–15.5)
HGB BLD-MCNC: 14.1 G/DL — SIGNIFICANT CHANGE UP (ref 11.5–15.5)
IMM GRANULOCYTES NFR BLD AUTO: 0.4 % — SIGNIFICANT CHANGE UP (ref 0–1.5)
LACTATE BLDV-MCNC: 2 MMOL/L — SIGNIFICANT CHANGE UP (ref 0.7–2)
LIDOCAIN IGE QN: 62 U/L — HIGH (ref 7–60)
LYMPHOCYTES # BLD AUTO: 1.14 K/UL — SIGNIFICANT CHANGE UP (ref 1–3.3)
LYMPHOCYTES # BLD AUTO: 1.19 K/UL — SIGNIFICANT CHANGE UP (ref 1–3.3)
LYMPHOCYTES # BLD AUTO: 10.1 % — LOW (ref 13–44)
LYMPHOCYTES # BLD AUTO: 10.2 % — LOW (ref 13–44)
MANUAL SMEAR VERIFICATION: SIGNIFICANT CHANGE UP
MCHC RBC-ENTMCNC: 27.7 PG — SIGNIFICANT CHANGE UP (ref 27–34)
MCHC RBC-ENTMCNC: 28 PG — SIGNIFICANT CHANGE UP (ref 27–34)
MCHC RBC-ENTMCNC: 32.4 GM/DL — SIGNIFICANT CHANGE UP (ref 32–36)
MCHC RBC-ENTMCNC: 32.9 GM/DL — SIGNIFICANT CHANGE UP (ref 32–36)
MCV RBC AUTO: 85.1 FL — SIGNIFICANT CHANGE UP (ref 80–100)
MCV RBC AUTO: 85.6 FL — SIGNIFICANT CHANGE UP (ref 80–100)
MONOCYTES # BLD AUTO: 1.54 K/UL — HIGH (ref 0–0.9)
MONOCYTES # BLD AUTO: 1.59 K/UL — HIGH (ref 0–0.9)
MONOCYTES NFR BLD AUTO: 13.4 % — SIGNIFICANT CHANGE UP (ref 2–14)
MONOCYTES NFR BLD AUTO: 13.7 % — SIGNIFICANT CHANGE UP (ref 2–14)
NEUTROPHILS # BLD AUTO: 8.24 K/UL — HIGH (ref 1.8–7.4)
NEUTROPHILS # BLD AUTO: 8.75 K/UL — HIGH (ref 1.8–7.4)
NEUTROPHILS NFR BLD AUTO: 61.5 % — SIGNIFICANT CHANGE UP (ref 43–77)
NEUTROPHILS NFR BLD AUTO: 74 % — SIGNIFICANT CHANGE UP (ref 43–77)
NEUTS BAND # BLD: 12 % — HIGH (ref 0–8)
NRBC # BLD: 0 /100 WBCS — SIGNIFICANT CHANGE UP (ref 0–0)
PCO2 BLDV: 38 MMHG — SIGNIFICANT CHANGE UP (ref 35–50)
PH BLDV: 7.42 — SIGNIFICANT CHANGE UP (ref 7.35–7.45)
PLAT MORPH BLD: NORMAL — SIGNIFICANT CHANGE UP
PLATELET # BLD AUTO: 331 K/UL — SIGNIFICANT CHANGE UP (ref 150–400)
PLATELET # BLD AUTO: 392 K/UL — SIGNIFICANT CHANGE UP (ref 150–400)
PO2 BLDV: 44 MMHG — SIGNIFICANT CHANGE UP (ref 25–45)
POTASSIUM BLDV-SCNC: 3.5 MMOL/L — SIGNIFICANT CHANGE UP (ref 3.5–5.3)
POTASSIUM SERPL-MCNC: 3.3 MMOL/L — LOW (ref 3.5–5.3)
POTASSIUM SERPL-MCNC: 3.3 MMOL/L — LOW (ref 3.5–5.3)
POTASSIUM SERPL-SCNC: 3.3 MMOL/L — LOW (ref 3.5–5.3)
POTASSIUM SERPL-SCNC: 3.3 MMOL/L — LOW (ref 3.5–5.3)
PROT SERPL-MCNC: 6 G/DL — SIGNIFICANT CHANGE UP (ref 6–8.3)
PROT SERPL-MCNC: 6.8 G/DL — SIGNIFICANT CHANGE UP (ref 6–8.3)
RBC # BLD: 4.58 M/UL — SIGNIFICANT CHANGE UP (ref 3.8–5.2)
RBC # BLD: 5.04 M/UL — SIGNIFICANT CHANGE UP (ref 3.8–5.2)
RBC # FLD: 12.5 % — SIGNIFICANT CHANGE UP (ref 10.3–14.5)
RBC # FLD: 12.6 % — SIGNIFICANT CHANGE UP (ref 10.3–14.5)
RBC BLD AUTO: SIGNIFICANT CHANGE UP
SAO2 % BLDV: 76 % — SIGNIFICANT CHANGE UP (ref 67–88)
SARS-COV-2 RNA SPEC QL NAA+PROBE: SIGNIFICANT CHANGE UP
SODIUM SERPL-SCNC: 139 MMOL/L — SIGNIFICANT CHANGE UP (ref 135–145)
SODIUM SERPL-SCNC: 140 MMOL/L — SIGNIFICANT CHANGE UP (ref 135–145)
WBC # BLD: 11.21 K/UL — HIGH (ref 3.8–10.5)
WBC # BLD: 11.83 K/UL — HIGH (ref 3.8–10.5)
WBC # FLD AUTO: 11.21 K/UL — HIGH (ref 3.8–10.5)
WBC # FLD AUTO: 11.83 K/UL — HIGH (ref 3.8–10.5)

## 2021-06-23 PROCEDURE — 99284 EMERGENCY DEPT VISIT MOD MDM: CPT

## 2021-06-23 PROCEDURE — 99218: CPT

## 2021-06-23 RX ORDER — POTASSIUM CHLORIDE 20 MEQ
40 PACKET (EA) ORAL ONCE
Refills: 0 | Status: COMPLETED | OUTPATIENT
Start: 2021-06-23 | End: 2021-06-23

## 2021-06-23 RX ORDER — SODIUM CHLORIDE 9 MG/ML
1000 INJECTION, SOLUTION INTRAVENOUS
Refills: 0 | Status: DISCONTINUED | OUTPATIENT
Start: 2021-06-23 | End: 2021-06-27

## 2021-06-23 RX ORDER — PANTOPRAZOLE SODIUM 20 MG/1
40 TABLET, DELAYED RELEASE ORAL
Refills: 0 | Status: DISCONTINUED | OUTPATIENT
Start: 2021-06-24 | End: 2021-06-27

## 2021-06-23 RX ORDER — POTASSIUM CHLORIDE 20 MEQ
40 PACKET (EA) ORAL ONCE
Refills: 0 | Status: DISCONTINUED | OUTPATIENT
Start: 2021-06-23 | End: 2021-06-23

## 2021-06-23 RX ORDER — SODIUM CHLORIDE 9 MG/ML
1000 INJECTION INTRAMUSCULAR; INTRAVENOUS; SUBCUTANEOUS ONCE
Refills: 0 | Status: COMPLETED | OUTPATIENT
Start: 2021-06-23 | End: 2021-06-23

## 2021-06-23 RX ORDER — PANTOPRAZOLE SODIUM 20 MG/1
40 TABLET, DELAYED RELEASE ORAL ONCE
Refills: 0 | Status: COMPLETED | OUTPATIENT
Start: 2021-06-23 | End: 2021-06-23

## 2021-06-23 RX ORDER — ONDANSETRON 8 MG/1
4 TABLET, FILM COATED ORAL ONCE
Refills: 0 | Status: COMPLETED | OUTPATIENT
Start: 2021-06-23 | End: 2021-06-23

## 2021-06-23 RX ORDER — PANTOPRAZOLE SODIUM 20 MG/1
40 TABLET, DELAYED RELEASE ORAL ONCE
Refills: 0 | Status: DISCONTINUED | OUTPATIENT
Start: 2021-06-23 | End: 2021-06-23

## 2021-06-23 RX ORDER — HYDROCORTISONE 20 MG
100 TABLET ORAL EVERY 8 HOURS
Refills: 0 | Status: DISCONTINUED | OUTPATIENT
Start: 2021-06-23 | End: 2021-06-23

## 2021-06-23 RX ORDER — ONDANSETRON 8 MG/1
4 TABLET, FILM COATED ORAL EVERY 8 HOURS
Refills: 0 | Status: DISCONTINUED | OUTPATIENT
Start: 2021-06-23 | End: 2021-06-27

## 2021-06-23 RX ORDER — HYDROCORTISONE 20 MG
100 TABLET ORAL EVERY 8 HOURS
Refills: 0 | Status: DISCONTINUED | OUTPATIENT
Start: 2021-06-23 | End: 2021-06-27

## 2021-06-23 RX ADMIN — SODIUM CHLORIDE 1000 MILLILITER(S): 9 INJECTION INTRAMUSCULAR; INTRAVENOUS; SUBCUTANEOUS at 14:21

## 2021-06-23 RX ADMIN — Medication 100 MILLIGRAM(S): at 17:53

## 2021-06-23 RX ADMIN — SODIUM CHLORIDE 1000 MILLILITER(S): 9 INJECTION INTRAMUSCULAR; INTRAVENOUS; SUBCUTANEOUS at 17:43

## 2021-06-23 RX ADMIN — SODIUM CHLORIDE 100 MILLILITER(S): 9 INJECTION, SOLUTION INTRAVENOUS at 19:41

## 2021-06-23 RX ADMIN — ONDANSETRON 4 MILLIGRAM(S): 8 TABLET, FILM COATED ORAL at 17:38

## 2021-06-23 RX ADMIN — ONDANSETRON 4 MILLIGRAM(S): 8 TABLET, FILM COATED ORAL at 14:20

## 2021-06-23 RX ADMIN — PANTOPRAZOLE SODIUM 40 MILLIGRAM(S): 20 TABLET, DELAYED RELEASE ORAL at 17:54

## 2021-06-23 RX ADMIN — Medication 40 MILLIEQUIVALENT(S): at 15:31

## 2021-06-23 NOTE — ED CDU PROVIDER INITIAL DAY NOTE - OBJECTIVE STATEMENT
52 y/o F w/ pmhx of UC (dx 16 yrs ago) presents today c/o n/v (2 days worth all NBNB) and multiple episodes of bloody diarrhea. Pt was recently at [facility] for UC flare. States yesterday she has had about 15 bowel movements yesterday (diarrhea has been going on for 2-3 weeks).  GI doc is Dr. Derek Harrington and told pt to come to ED today. Pt states she has lost about 15 lbs over the past few weeks. 5 episodes of diarrhea while in ED.   Denies f/c, cp, sob, urinary sx. Abd surgical hx consist of 2 c-sections 18 and 17 yrs ago. Had "tummy-tuck" about 5 yrs ago. Pt on mesalamine. Was taking augmentin but stopped recently (was given during her previous hospitalization).  c. diff negative at [place].

## 2021-06-23 NOTE — ED CDU PROVIDER DISPOSITION NOTE - NSFOLLOWUPINSTRUCTIONS_ED_ALL_ED_FT
Your diagnoses at this time was: Ulcerative Colitis   You were seen by Gastroenterology and were started on IV steroids.     Hydrate.     Please start taking oral Prednisone daily. While taking the steroid please take Pantoprazole 40mg in the morning. Please follow up with your gastroenterologist this week, call today to make an appointment to start Entyvio.    You may be contacted by our Emergency Department Referrals Coordinator to set up your follow up appointment within 24-48 hours of your discharge Monday- Friday. We recommend you follow up with your primary care provider within the next 2-3 days, please bring all of your results with you.     Please return to the Emergency Department with new, worsening, or concerning symptoms, such as:  -Worsening abdominal pain, persistent vomiting and diarrhea   -Shortness of breath or trouble breathing  -Pressure, pain, tightness in chest  -Facial drooping, arm weakness, or speech difficulty   -Head injury or loss of consciousness   -Nonstop bleeding or an open wound     *More detailed information regarding your visit and discharge can be found by reviewing this packet Your diagnoses at this time was: Ulcerative Colitis   You were seen by Gastroenterology and were started on steroids.     Stay hydrated.     Please start taking oral Prednisone daily. While taking the steroid please take Pantoprazole 40mg in the morning. Please follow up with your gastroenterologist Dr. Harrington tomorrow as scheduled. Prednisone to be tapered by Dr. Harrington in the office  Entyvio first dose scheduled to be given tomorrow 6/25 in Dr. Young's office     We recommend you follow up with your primary care provider within the next 2-3 days as well.  Please bring all of your results with you.     Please return to the Emergency Department with new, worsening, or concerning symptoms, such as:  -Worsening abdominal pain, persistent vomiting and diarrhea   -Shortness of breath or trouble breathing  -Fevers, chills  -Pressure, pain, tightness in chest  -Facial drooping, arm weakness, or speech difficulty   -Head injury or loss of consciousness   -Nonstop bleeding or an open wound     *More detailed information regarding your visit and discharge can be found by reviewing this packet

## 2021-06-23 NOTE — CONSULT NOTE ADULT - ASSESSMENT
52 y/o F w/ pmhx of UC (dx 16 yrs ago) presents today c/o n/v (2 days worth all NBNB) and diarrhea. Pt was recently at [facility] for UC flare - C diff negative and discharged on PO Augmentin.  Seen in office 6/17 for post-hospital follow up and underwent a flex sigmoidoscopy - no pseudombranes +moderate to severe proctitis and moderately diffuse sigmoid colitis (PATH chronic active colitis, no granulomata or dysplasia) C diff specimen aspirated - NEGATIVE and Hepatitis and Quantiferon gold labs sent in anticipation for biologic Rx with Entyvio (labs negative for Hepatitis or latent TB)  On 6/17 Augmentin was stopped and pt was started on Uceris foam UT and Uceris PO in attempt to temporize until biologic Rx approval was received with plans to possibly upgrade to steroids if symptoms worsened pending initiation of biologic treatment.    Presented to ER with worsening symptoms (nausea/vomiting and increased stooling with blood)    UC flare with bandemia - clinically suspicious for worsening UC . C diff negative from [place] and flex sig aspirate  -IV hydration  -Zofran PRN  -start IV Hydrocortisone 100mg q 8 hours and monitor for improvement.  If improved within next 24 hours then plan to d/c on PO Prednisone with outpt Entyvio in office this week (authorization received and dose in office ready for pt)  -Protonix for GI prophylaxis while on steroids  -OK for PO diet (low residue)  -Recommend monitor in CDU    Discussed with ER team and primary GI (Dr AMADOU Harrington)  Discussed with pt and spouse at bedside    Thank you for the courtesy of this consult.    Stephan Fuentes PA-C    [facility]  [phone]  cell [phone]  After hours and weekend coverage [phone]

## 2021-06-23 NOTE — ED PROVIDER NOTE - NS ED ROS FT
CONSTITUTIONAL - No fever, No diaphoresis, No weight change  EYES - No eye pain, No blurred vision  ENT - No change in hearing, No sore throat, No neck pain, No rhinorrhea, No ear pain  RESPIRATORY - No shortness of breath, No cough  CARDIAC -No chest pain, No palpitations  GI - No abdominal pain, +nausea, +vomiting, +diarrhea, No constipation  - No dysuria, no frequency, no hematuria.   MUSCULOSKELETAL - No joint pain, No swelling, No back pain  NEUROLOGIC - No numbness, No focal weakness, No headache, No dizziness

## 2021-06-23 NOTE — ED ADULT NURSE NOTE - OBJECTIVE STATEMENT
52 y/o female  arrives to the ER ambulatory  complaining of vomiting, diarrhea. PMH of ulcerative colitis Pt is A&Ox4, speaking coherently. Pt report vomiting an diarrhea for the past two days. Reports being admitted last week in [facility] for colitis. Pt states having more than 12 episodes of vomiting and diarrhea yesterday, called her gastroenterologists advising her to come to the ER for further evaluation.   Pt denies SOB, chest pain, dizziness,  urinary symptoms, fevers, chills.    On assessment airway is patent, breathing spontaneously and unlabored. Skin is dry, warm and color appropriate to race.  Abdomen is soft, no distended, no tender. Full ROM in all extremities. Comfort measures provided. call bell within reach, bed locked in the lowest position. will continue to reassess . 50 y/o female  arrives to the ER ambulatory  complaining of vomiting, diarrhea. PMH of ulcerative colitis Pt is A&Ox4, speaking coherently. Pt report vomiting an diarrhea for the past two/ three weeks. Reports being admitted last week in [facility] for colitis. Report losing more than 15 pound over the last few weeks. Pt states having more than 15 episodes of vomiting and diarrhea yesterday, called her gastroenterologists advising her to come to the ER for further evaluation.  Pt denies SOB, chest pain, dizziness,  urinary symptoms, fevers, chills.    On assessment airway is patent, breathing spontaneously and unlabored. Skin is dry, warm and color appropriate to race.  Abdomen is soft, no distended, no tender. Full ROM in all extremities. Comfort measures provided. call bell within reach, bed locked in the lowest position. will continue to reassess .

## 2021-06-23 NOTE — ED CDU PROVIDER DISPOSITION NOTE - CLINICAL COURSE
50 y/o F w/ pmhx of UC (dx 16 yrs ago) presents today c/o n/v (2 days worth all NBNB) and multiple episodes of bloody diarrhea. Pt was recently at [facility] for UC flare. States yesterday she has had about 15 bowel movements yesterday (diarrhea has been going on for 2-3 weeks).  GI doc is Dr. Derek Harrington and told pt to come to ED today. Pt states she has lost about 15 lbs over the past few weeks. 5 episodes of diarrhea while in ED. Denies f/c, cp, sob, urinary sx. Abd surgical hx consist of 2 c-sections 18 and 17 yrs ago. Had "tummy-tuck" about 5 yrs ago. Pt on mesalamine. Was taking augmentin but stopped recently (was given during her previous hospitalization).  c. diff negative at [place].  ED course: Evaluated by GI, started on IV steroids with improvement in symptoms. Patient also with improvement in diarrhea. Plan for pain control, serial abdominal exams, PO challenge, and GI reevaluation in CDU. 50 y/o F w/ pmhx of UC (dx 16 yrs ago) presents today c/o n/v (2 days worth all NBNB) and multiple episodes of bloody diarrhea. Pt was recently at [facility] for UC flare. States yesterday she has had about 15 bowel movements yesterday (diarrhea has been going on for 2-3 weeks).  GI doc is Dr. Derek Harrington and told pt to come to ED today. Pt states she has lost about 15 lbs over the past few weeks. 5 episodes of diarrhea while in ED. Denies f/c, cp, sob, urinary sx. Abd surgical hx consist of 2 c-sections 18 and 17 yrs ago. Had "tummy-tuck" about 5 yrs ago. Pt on mesalamine. Was taking augmentin but stopped recently (was given during her previous hospitalization).  c. diff negative at [place].  ED course: Evaluated by GI, started on IV steroids with improvement in symptoms. Patient also with improvement in diarrhea. Plan for pain control, serial abdominal exams, PO challenge, and GI reevaluation in CDU.  pt seen by GI, was started on steroids. symptoms improved. pt was discharged home with outpt followup.

## 2021-06-23 NOTE — ED PROVIDER NOTE - OBJECTIVE STATEMENT
50 y/o F w/ pmhx of UC (dx 16 yrs ago) presents today c/o n/v and diarrhea. Pt was recently at [facility] for UC flare. States yesterday she has had about 15 bowel movements yesterday (diarrhea has been going on for 2-3 weeks). GI doc is Dr. Derek Harrington and told pt to come to ED today. Pt states she has lost about 15 lbs over the past few weeks. Denies f/c, cp, sob, urinary sx. Abd surgical hx consist of 2 c-sections 18 and 17 yrs ago. Had "tummy-tuck" about 5 yrs ago. 52 y/o F w/ pmhx of UC (dx 16 yrs ago) presents today c/o n/v (2 days worth all NBNB) and diarrhea. Pt was recently at [facility] for UC flare. States yesterday she has had about 15 bowel movements yesterday (diarrhea has been going on for 2-3 weeks). GI doc is Dr. Derek Harrington and told pt to come to ED today. Pt states she has lost about 15 lbs over the past few weeks. Denies f/c, cp, sob, urinary sx. Abd surgical hx consist of 2 c-sections 18 and 17 yrs ago. Had "tummy-tuck" about 5 yrs ago. 50 y/o F w/ pmhx of UC (dx 16 yrs ago) presents today c/o n/v (2 days worth all NBNB) and diarrhea. Pt was recently at [facility] for UC flare. States yesterday she has had about 15 bowel movements yesterday (diarrhea has been going on for 2-3 weeks). GI doc is Dr. Derek Harrington and told pt to come to ED today. Pt states she has lost about 15 lbs over the past few weeks. Denies f/c, cp, sob, urinary sx. Abd surgical hx consist of 2 c-sections 18 and 17 yrs ago. Had "tummy-tuck" about 5 yrs ago. Pt on budesonide and mesalamine. Was taking augmentin but stopped recently (was given during her previous hospitalization). 50 y/o F w/ pmhx of UC (dx 16 yrs ago) presents today c/o n/v (2 days worth all NBNB) and diarrhea. Pt was recently at [facility] for UC flare. States yesterday she has had about 15 bowel movements yesterday (diarrhea has been going on for 2-3 weeks). GI doc is Dr. Derek Harrington and told pt to come to ED today. Pt states she has lost about 15 lbs over the past few weeks. Denies f/c, cp, sob, urinary sx. Abd surgical hx consist of 2 c-sections 18 and 17 yrs ago. Had "tummy-tuck" about 5 yrs ago. Pt on budesonide and mesalamine. Was taking augmentin but stopped recently (was given during her previous hospitalization).      Attn - pt seen in OR56- agree with above - pt has hx of UC and c/o intermittent colicky abdo pain x 2 weeks with n/v since last night.  pt was admitted to [facility] for 3 days approx 2 weeks ago, but bloody diarrhea has not resolved.  pt seen by Dr. AMADOU Harrington Monday - GI.  no resp or gu symptoms.  no fever,  c/o generalized weakness and fatigue.  no leg cramps. 52 y/o F w/ pmhx of UC (dx 16 yrs ago) presents today c/o n/v (2 days worth all NBNB) and diarrhea. Pt was recently at [facility] for UC flare. States yesterday she has had about 15 bowel movements yesterday (diarrhea has been going on for 2-3 weeks). Does admit to some blood in diarrhea. GI doc is Dr. Derek Harrington and told pt to come to ED today. Pt states she has lost about 15 lbs over the past few weeks. Denies f/c, cp, sob, urinary sx. Abd surgical hx consist of 2 c-sections 18 and 17 yrs ago. Had "tummy-tuck" about 5 yrs ago. Pt on budesonide and mesalamine. Was taking augmentin but stopped recently (was given during her previous hospitalization).      Attn - pt seen in OR56- agree with above - pt has hx of UC and c/o intermittent colicky abdo pain x 2 weeks with n/v since last night.  pt was admitted to [facility] for 3 days approx 2 weeks ago, but bloody diarrhea has not resolved.  pt seen by Dr. AMADOU Harrington Monday - GI.  no resp or gu symptoms.  no fever,  c/o generalized weakness and fatigue.  no leg cramps.

## 2021-06-23 NOTE — CONSULT NOTE ADULT - SUBJECTIVE AND OBJECTIVE BOX
Patient is a 51y old  Female who presents with a chief complaint of     HPI:    52 y/o F w/ pmhx of UC (dx 16 yrs ago) presents today c/o n/v (2 days worth all NBNB) and diarrhea. Pt was recently at [facility] for UC flare - C diff negative and discharged on PO Augmentin.  Seen in office  for post-hospital follow up and underwent a flex sigmoidoscopy - no pseudombranes +moderate to severe proctitis and moderately diffuse sigmoid colitis (PATH chronic active colitis, no granulomata or dysplasia) C diff specimen aspirated - NEGATIVE and Hepatitis and Quantiferon gold labs sent in anticipation for biologic Rx with Entyvio (labs negative and entyvio pre-auth received today) Augmentin was stopped and pt was started on Uceris foam AL and Uceris PO in attempt to temporize until biologic Rx approval was received with plans to possibly upgrade to steroids if symptoms worsened pending initiation of biologic treatment.     States yesterday she has had about 15 bowel movements yesterday with associated urgency cramps and bleeding ( loose stool with blood, diarrhea has been going on for 2-3 weeks) as well as nausea and vomiting. GI doc is Dr. Derek Harrington and told pt to come to ED today. Pt states she has lost about 15 lbs over the past few weeks. Denies fever or chills, cp, sob, urinary sx.     Abd surgical hx consist of 2 c-sections 18 and 17 yrs ago. Had "tummy-tuck" about 5 yrs ago.     Since arrival to ER and IV hydration feeling "better" no further nasuea or vomiting, feeling hungry  no BM or bleeding since arrival to ER      PAST MEDICAL & SURGICAL HISTORY:  Ulcerative colitis    s/p abdominoplasty    H/O  section        Allergies  No Known Allergies          MEDICATIONS  (STANDING):    MEDICATIONS  (PRN):      Social History:  , lives with family  no tobacco      Family History   IBD (  ) Yes   (  ) No  GI Malignancy (  )  Yes    (  ) No    Health Management     Last Colonoscopy - 2020 mild to moderate proctitis, sessile rectal polyp (PATH chronic active colitis with ulceration and polypoid granulation tissue); remainder of colon WNL      (     ) Advanced Directives: (  X   ) None    (      ) DNR    (     ) DNI    (     ) Health Care Proxy:     Review of Systems:    General:  No  fevers, chills, night sweats  see HPI  CV:  No pain, palpitations, hypo/hypertension  Resp:  No dyspnea, cough, tachypnea, wheezing  GI:  see HPI  :  No pain, bleeding, incontinence, nocturia  Muscle:  No pain, weakness  Neuro:  No weakness, tingling, memory problems  Psych:  No fatigue, insomnia, mood problems, depression  Endocrine:  No polyuria, polydypsia, cold/heat intolerance  Heme:  No petechiae, ecchymosis, easy bruisability  Skin:  No rash, tattoos, scars, edema      Vital Signs Last 24 Hrs  T(C): 36.8 (2021 14:27), Max: 36.8 (2021 14:27)  T(F): 98.3 (2021 14:27), Max: 98.3 (2021 14:27)  HR: 110 (2021 15:38) (108 - 130)  BP: 129/79 (2021 15:38) (116/82 - 129/79)  BP(mean): --  RR: 16 (2021 15:38) (16 - 18)  SpO2: 99% (2021 15:38) (95% - 99%)    PHYSICAL EXAM:    Constitutional: NAD, well-developed non toxica appearing WF  spouse at bedside  Neck: No LAD, supple no JVD  Respiratory: Clear b/l  Cardiovascular: S1 and S2, RRR  Gastrointestinal: BS+, soft, NT/ND, neg HSM, no rebound guarding or rigidity  Extremities: No peripheral edema, neg clubbing, cyanosis  Vascular: 2+ peripheral pulses  Neurological: A/O x 3, no focal deficits  Psychiatric: Normal mood, normal affect  Skin: No rashes, anicteric        LABS:                        14.1    )-----------( 392      ( 2021 14:34 )             42.9   Manual Differential (21 @ 14:34)   Red Cell Morphology: Non specific   Platelet Morphology: Normal   Manual Smear Verification: Performed   Band Neutrophils %: 12.0 %         139  |  104  |  13  ----------------------------<  126<H>  3.3<L>   |  18<L>  |  0.49<L>    Ca    9.3      2021 14:34    TPro  6.8  /  Alb  3.5  /  TBili  0.6  /  DBili  x   /  AST  17  /  ALT  22  /  AlkPhos  124<H>        Blood Gas Venous - Lactate: 2.0 mmoL/L (21 @ 14:34)     HCG Quantitative, Serum (21 @ 14:34)   Lipase, Serum (21 @ 14:34)   Lipase, Serum: 62 U/L    OFFICE LABS REVIEWED 2021  CRP: 10  BUN/Cr 6/0.57  HBsAg Nonreactive  HBsAb Nonreactive  HBcAb Nonreactive  Hep C Ab Nonreactive    Quantiferon Gold NEGATIVE  C diff (aspirated from flex sig) toxin A+B  NEGATIVE      HCG Quantitative, Serum: <2.0    RADIOLOGY & ADDITIONAL TESTS:

## 2021-06-23 NOTE — ED PROVIDER NOTE - PHYSICAL EXAMINATION
CONSTITUTIONAL: Well-developed; well-nourished; in no acute distress.   SKIN: warm, dry  HEAD: Normocephalic; atraumatic.  EYES: no conjunctival injection. PERRL.   ENT: No nasal discharge; airway clear.  NECK: Supple; non tender.  CARD: S1, S2 normal; no murmurs, gallops, or rubs. tachycardic    RESP: No wheezes, rales or rhonchi. Good air movement bilaterally.   ABD: soft ntnd, no guarding, no distention, no rigidity.   EXT: Ambulates independently.  No cyanosis or edema.   NEURO: Alert, oriented, grossly unremarkable  PSYCH: Cooperative, appropriate. CONSTITUTIONAL: Well-developed; well-nourished; in no acute distress.   SKIN: warm, dry  HEAD: Normocephalic; atraumatic.  EYES: no conjunctival injection. PERRL.   ENT: No nasal discharge; airway clear.  NECK: Supple; non tender.  CARD: S1, S2 normal; no murmurs, gallops, or rubs. tachycardic    RESP: No wheezes, rales or rhonchi. Good air movement bilaterally.   ABD: soft ntnd, no guarding, no distention, no rigidity.   EXT: Ambulates independently.  No cyanosis or edema.   NEURO: Alert, oriented, grossly unremarkable  PSYCH: Cooperative, appropriate.      Attn - alert, NAD, no pallor or jaundice, PERRL 3 mm, moist mm, skin - warm and dry, Lungs - clear, no w/r/r, good BS bilaterally, Cor - rr, no M, no rub, Abdo - ND, soft, NT, no HSM, no CVAT, no guarding or rebound. Extremities - no edema, no calf tenderness, distal pulses intact and symmetrical, Neuro - intact and non-focal

## 2021-06-23 NOTE — ED CDU PROVIDER INITIAL DAY NOTE - PROGRESS NOTE DETAILS
Patient seen at bedside in the ED. Patient tachycardiac although well appearing, states that she feels improved after IV steroids.  Patient resting comfortably, no complaints.  Will reevaluate. Abdomen soft nontender. States that she has not had diarrhea in over 2 hours. - Camryn Reno PA-C

## 2021-06-23 NOTE — ED PROVIDER NOTE - CLINICAL SUMMARY MEDICAL DECISION MAKING FREE TEXT BOX
Pepe DO PGY-1: pt w/ hx of UC presents after being told to come to ED by her GI doc. Pt c/o n/v (past 2 days) and diarrhea (diarrhea has been since her visit to [facility]). Will contact Dr. Harrington. Ordered labs, ivf, and zofran. will reassess. Dispo pending workup, Pepe DO PGY-1: pt w/ hx of UC presents after being told to come to ED by her GI doc. Pt c/o n/v (past 2 days) and diarrhea (diarrhea has been since her visit to [facility]). Will contact Dr. Harrington. Ordered labs, ivf, and zofran. will reassess. Dispo pending workup,     Attn - UC exacerbation, now with vomiting and sent to ER for management and poss admission - IV hydration, zofran, labs/lytes and reassess.

## 2021-06-23 NOTE — ED ADULT NURSE REASSESSMENT NOTE - ANCILLARY STATUS
frequent reeval, vitals per routine, serial abdominal exams, pain control, IVF, anti-emetics, IV steroids, IV protonix, GI following

## 2021-06-23 NOTE — ED CDU PROVIDER DISPOSITION NOTE - PATIENT PORTAL LINK FT
You can access the FollowMyHealth Patient Portal offered by [facility] by registering at the following website: [email or web address]. By joining [facility]’s FollowMyHealth portal, you will also be able to view your health information using other applications (apps) compatible with our system.

## 2021-06-23 NOTE — ED PROVIDER NOTE - PROGRESS NOTE DETAILS
Pepe TRAN PGY-1: spoke to pt's GI doc who said he sent the pt in for symptom control but if pt is too sick (or unable to control symptoms) then admit.

## 2021-06-23 NOTE — ED CDU PROVIDER INITIAL DAY NOTE - CARDIAC, MLM
The types of intraocular lenses were reviewed with the patient along with a discussion of their various strengths and weaknesses. Normal rate, regular rhythm.  Heart sounds S1, S2.  No murmurs, rubs or gallops.

## 2021-06-23 NOTE — ED CDU PROVIDER DISPOSITION NOTE - ATTENDING CONTRIBUTION TO CARE
Patient is a 52 yo F with history of ulcerative colitis, on mesalamine. She presented with nausea, vomiting, bloody diarrhea. She had a CT 6/12/21 and was found to have mild diffuse thickening of the colon wall consistent with the provided history of ulcerative colitis. Patient's GI doc is Dr. Derek Harrington and told pt to come to ED today. Patient was taking augmentin but stopped recently (was given during her previous hospitalization). C. diff negative at [place]. She was seen by GI who recommended steroids.    VS noted  Gen. no acute distress, Non toxic   HEENT: EOMI, mmm,   Lungs: CTAB/L no C/ W /R   CVS: RRR   Abd; Soft non tender, non distended   Ext: no edema  Skin: no rash  Neuro AAOx3 non focal clear speech  a/p: UC flare: Labs significant for bandemia of 12%. They reassessed the patient, states labs are improving. Plan for discharge and follow up with Dr. Harrington tomorrow. d/c with 40 mg PO Prednisone x 1 week, with outpt Entyvio in office this week (authorization received and dose in office ready for pt). Patient feels much better overall.   - Moises PERLA Patient is a 50 yo F with history of ulcerative colitis, on mesalamine. She presented with nausea, vomiting, bloody diarrhea. She had a CT 6/12/21 and was found to have mild diffuse thickening of the colon wall consistent with the provided history of ulcerative colitis. Patient's GI doc is Dr. Derek Harrington and told pt to come to ED today. Patient was taking augmentin but stopped recently (was given during her previous hospitalization). C. diff negative at [place]. She was seen by GI who recommended steroids.    VS noted  Gen. no acute distress, Non toxic   HEENT: EOMI, mmm  Lungs: CTAB/L no C/ W /R   CVS: RRR   Abd; Soft non tender, non distended   Ext: no edema  Skin: no rash  Neuro AAOx3 non focal clear speech  a/p: UC flare: Labs significant for bandemia of 12%. They reassessed the patient, states labs are improving. Plan for discharge and follow up with Dr. Harrington tomorrow. d/c with 40 mg PO Prednisone x 1 week, with outpt Entyvio in office this week (authorization received and dose in office ready for pt). Patient feels much better overall.   - Moises PERLA

## 2021-06-23 NOTE — ED CDU PROVIDER INITIAL DAY NOTE - DETAILS
frequent reeval, vitals per routine, serial abdominal exams, pain control, IVF, anti-emetics, IV steroids, IV protonix, GI following  d/w attending

## 2021-06-24 VITALS
TEMPERATURE: 98 F | HEART RATE: 98 BPM | DIASTOLIC BLOOD PRESSURE: 82 MMHG | SYSTOLIC BLOOD PRESSURE: 141 MMHG | RESPIRATION RATE: 14 BRPM | OXYGEN SATURATION: 98 %

## 2021-06-24 DIAGNOSIS — K51.90 ULCERATIVE COLITIS, UNSPECIFIED, WITHOUT COMPLICATIONS: ICD-10-CM

## 2021-06-24 DIAGNOSIS — F41.9 ANXIETY DISORDER, UNSPECIFIED: ICD-10-CM

## 2021-06-24 LAB
ALBUMIN SERPL ELPH-MCNC: 2.9 G/DL — LOW (ref 3.3–5)
ALP SERPL-CCNC: 99 U/L — SIGNIFICANT CHANGE UP (ref 40–120)
ALT FLD-CCNC: 15 U/L — SIGNIFICANT CHANGE UP (ref 10–45)
ANION GAP SERPL CALC-SCNC: 13 MMOL/L — SIGNIFICANT CHANGE UP (ref 5–17)
AST SERPL-CCNC: 9 U/L — LOW (ref 10–40)
BASOPHILS # BLD AUTO: 0.04 K/UL — SIGNIFICANT CHANGE UP (ref 0–0.2)
BASOPHILS NFR BLD AUTO: 0.4 % — SIGNIFICANT CHANGE UP (ref 0–2)
BILIRUB SERPL-MCNC: 0.4 MG/DL — SIGNIFICANT CHANGE UP (ref 0.2–1.2)
BUN SERPL-MCNC: 8 MG/DL — SIGNIFICANT CHANGE UP (ref 7–23)
CALCIUM SERPL-MCNC: 8.8 MG/DL — SIGNIFICANT CHANGE UP (ref 8.4–10.5)
CHLORIDE SERPL-SCNC: 108 MMOL/L — SIGNIFICANT CHANGE UP (ref 96–108)
CO2 SERPL-SCNC: 19 MMOL/L — LOW (ref 22–31)
CREAT SERPL-MCNC: 0.43 MG/DL — LOW (ref 0.5–1.3)
EOSINOPHIL # BLD AUTO: 0.01 K/UL — SIGNIFICANT CHANGE UP (ref 0–0.5)
EOSINOPHIL NFR BLD AUTO: 0.1 % — SIGNIFICANT CHANGE UP (ref 0–6)
GLUCOSE SERPL-MCNC: 133 MG/DL — HIGH (ref 70–99)
HCT VFR BLD CALC: 35.4 % — SIGNIFICANT CHANGE UP (ref 34.5–45)
HGB BLD-MCNC: 11.7 G/DL — SIGNIFICANT CHANGE UP (ref 11.5–15.5)
IMM GRANULOCYTES NFR BLD AUTO: 0.6 % — SIGNIFICANT CHANGE UP (ref 0–1.5)
LYMPHOCYTES # BLD AUTO: 0.67 K/UL — LOW (ref 1–3.3)
LYMPHOCYTES # BLD AUTO: 7.4 % — LOW (ref 13–44)
MCHC RBC-ENTMCNC: 27.7 PG — SIGNIFICANT CHANGE UP (ref 27–34)
MCHC RBC-ENTMCNC: 33.1 GM/DL — SIGNIFICANT CHANGE UP (ref 32–36)
MCV RBC AUTO: 83.9 FL — SIGNIFICANT CHANGE UP (ref 80–100)
MONOCYTES # BLD AUTO: 0.84 K/UL — SIGNIFICANT CHANGE UP (ref 0–0.9)
MONOCYTES NFR BLD AUTO: 9.3 % — SIGNIFICANT CHANGE UP (ref 2–14)
NEUTROPHILS # BLD AUTO: 7.39 K/UL — SIGNIFICANT CHANGE UP (ref 1.8–7.4)
NEUTROPHILS NFR BLD AUTO: 82.2 % — HIGH (ref 43–77)
NRBC # BLD: 0 /100 WBCS — SIGNIFICANT CHANGE UP (ref 0–0)
PLATELET # BLD AUTO: 374 K/UL — SIGNIFICANT CHANGE UP (ref 150–400)
POTASSIUM SERPL-MCNC: 3.2 MMOL/L — LOW (ref 3.5–5.3)
POTASSIUM SERPL-SCNC: 3.2 MMOL/L — LOW (ref 3.5–5.3)
PROT SERPL-MCNC: 5.6 G/DL — LOW (ref 6–8.3)
RBC # BLD: 4.22 M/UL — SIGNIFICANT CHANGE UP (ref 3.8–5.2)
RBC # FLD: 12.8 % — SIGNIFICANT CHANGE UP (ref 10.3–14.5)
SODIUM SERPL-SCNC: 140 MMOL/L — SIGNIFICANT CHANGE UP (ref 135–145)
WBC # BLD: 9 K/UL — SIGNIFICANT CHANGE UP (ref 3.8–10.5)
WBC # FLD AUTO: 9 K/UL — SIGNIFICANT CHANGE UP (ref 3.8–10.5)

## 2021-06-24 PROCEDURE — 82947 ASSAY GLUCOSE BLOOD QUANT: CPT

## 2021-06-24 PROCEDURE — 96376 TX/PRO/DX INJ SAME DRUG ADON: CPT

## 2021-06-24 PROCEDURE — 85014 HEMATOCRIT: CPT

## 2021-06-24 PROCEDURE — 84132 ASSAY OF SERUM POTASSIUM: CPT

## 2021-06-24 PROCEDURE — 82435 ASSAY OF BLOOD CHLORIDE: CPT

## 2021-06-24 PROCEDURE — 83605 ASSAY OF LACTIC ACID: CPT

## 2021-06-24 PROCEDURE — 82330 ASSAY OF CALCIUM: CPT

## 2021-06-24 PROCEDURE — 96375 TX/PRO/DX INJ NEW DRUG ADDON: CPT

## 2021-06-24 PROCEDURE — 83690 ASSAY OF LIPASE: CPT

## 2021-06-24 PROCEDURE — 99284 EMERGENCY DEPT VISIT MOD MDM: CPT | Mod: 25

## 2021-06-24 PROCEDURE — 96374 THER/PROPH/DIAG INJ IV PUSH: CPT

## 2021-06-24 PROCEDURE — 80053 COMPREHEN METABOLIC PANEL: CPT

## 2021-06-24 PROCEDURE — G0378: CPT

## 2021-06-24 PROCEDURE — 82565 ASSAY OF CREATININE: CPT

## 2021-06-24 PROCEDURE — 84295 ASSAY OF SERUM SODIUM: CPT

## 2021-06-24 PROCEDURE — 82803 BLOOD GASES ANY COMBINATION: CPT

## 2021-06-24 PROCEDURE — U0003: CPT

## 2021-06-24 PROCEDURE — 84702 CHORIONIC GONADOTROPIN TEST: CPT

## 2021-06-24 PROCEDURE — 99217: CPT

## 2021-06-24 PROCEDURE — 85018 HEMOGLOBIN: CPT

## 2021-06-24 PROCEDURE — 85025 COMPLETE CBC W/AUTO DIFF WBC: CPT

## 2021-06-24 RX ORDER — ACETAMINOPHEN 500 MG
1000 TABLET ORAL ONCE
Refills: 0 | Status: COMPLETED | OUTPATIENT
Start: 2021-06-24 | End: 2021-06-24

## 2021-06-24 RX ORDER — POTASSIUM CHLORIDE 20 MEQ
40 PACKET (EA) ORAL ONCE
Refills: 0 | Status: COMPLETED | OUTPATIENT
Start: 2021-06-24 | End: 2021-06-24

## 2021-06-24 RX ORDER — PANTOPRAZOLE SODIUM 20 MG/1
1 TABLET, DELAYED RELEASE ORAL
Qty: 14 | Refills: 0
Start: 2021-06-24 | End: 2021-07-07

## 2021-06-24 RX ADMIN — Medication 400 MILLIGRAM(S): at 10:21

## 2021-06-24 RX ADMIN — ONDANSETRON 4 MILLIGRAM(S): 8 TABLET, FILM COATED ORAL at 01:40

## 2021-06-24 RX ADMIN — Medication 40 MILLIEQUIVALENT(S): at 10:14

## 2021-06-24 RX ADMIN — Medication 100 MILLIGRAM(S): at 10:14

## 2021-06-24 RX ADMIN — Medication 100 MILLIGRAM(S): at 01:52

## 2021-06-24 RX ADMIN — SODIUM CHLORIDE 100 MILLILITER(S): 9 INJECTION, SOLUTION INTRAVENOUS at 07:09

## 2021-06-24 RX ADMIN — PANTOPRAZOLE SODIUM 40 MILLIGRAM(S): 20 TABLET, DELAYED RELEASE ORAL at 10:14

## 2021-06-24 NOTE — ED CDU PROVIDER SUBSEQUENT DAY NOTE - ATTENDING CONTRIBUTION TO CARE
Patient is a 52 yo F with history of ulcerative colitis, on mesalamine. She presented with nausea, vomiting, bloody diarrhea. She had a CT 6/12/21 and was found to have mild diffuse thickening of the colon wall consistent with the provided history of ulcerative colitis. Patient's GI doc is Dr. Derek Harrington and told pt to come to ED today. Patient was taking augmentin but stopped recently (was given during her previous hospitalization). C. diff negative at [place]. She was seen by GI who recommended steroids.    VS noted  Gen. no acute distress, Non toxic   HEENT: EOMI, mmm,   Lungs: CTAB/L no C/ W /R   CVS: RRR   Abd; Soft non tender, non distended   Ext: no edema  Skin: no rash  Neuro AAOx3 non focal clear speech  a/p: UC flare: Labs significant for bandemia of 12%. They reassessed the patient, states labs are improving. Plan for discharge and follow up with Dr. Harrington tomorrow. d/c with 40 mg PO Prednisone x 1 week, with outpt Entyvio in office this week (authorization received and dose in office ready for pt). Patient feels much better overall.   - Moises PERLA

## 2021-06-24 NOTE — ED CDU PROVIDER SUBSEQUENT DAY NOTE - PROGRESS NOTE DETAILS
Patient endorses 2 episodes of diarrhea, with mild nausea. Abdomen soft, nontender. Asking for medication for nausea. Will continue to monitor closely. - Camryn Reno PA-C Patient seen at bedside. VSS.  Patient resting comfortably, no complaints. States that nausea has improved. Denies abdominal pain. Abdomen soft, nontender. States that since last evaluation she has had 3 episodes of diarrhea.  Will reevaluate. - Camryn Reno PA-C Pt comfortable. Feeling much better today. mild abdominal discomfort. diarrhea improving. tolerating po. Vital signs stable/afebrile. abdomen soft nontender, nondistended. WBC count improved. seen by GI and cleared for discharge on prednisone with outpt GI followup. -Pamella Wyatt PA-C

## 2021-06-24 NOTE — ED CDU PROVIDER SUBSEQUENT DAY NOTE - NS ED ROS FT
Constitutional: +weight loss No fever or chills   Eyes: No visual changes  CV: No chest pain or lower extremity edema  Resp: No SOB no cough  GI:+abd pain. + nausea+ vomiting. + diarrhea.   : No dysuria, hematuria.   MSK: No musculoskeletal pain  Skin: No rash  Psych: No complaints   Neuro: No headache. No numbness or tingling.

## 2021-06-24 NOTE — ED CDU PROVIDER SUBSEQUENT DAY NOTE - HISTORY
No interval changes since initial CDU provider note. Pt feels well without complaint. NAD. Plan to continue IVF, IV steroids, and antiemetics in the setting of UC flare, with GI following.   - KARISSA Reno

## 2021-06-24 NOTE — ED CDU PROVIDER SUBSEQUENT DAY NOTE - PHYSICAL EXAMINATION
GEN: Well Appearing, Nontoxic, NAD  HEENT: NC/AT, Symm Facies. Eyes clear.   CV: No JVD/Bruits or stridor;  +S1S2, RRR w/o m/g/r  RESP: CTAB w/o w/r/r  ABD: Soft, nt/nd, +BS. No guarding/rebound.   EXT/MSK: No lower extremity edema or calf tenderness.   PSYCH: Appropriate mood and affect   Neuro: Grossly intact, AOX3 with normal speech, moving all extremities freely. Gait normal

## 2021-06-24 NOTE — ED ADULT NURSE REASSESSMENT NOTE - NS ED NURSE REASSESS COMMENT FT1
Patient given turkey sandwich and gingerale.
Report received from Apoorva SAPP. Patient resting comfortably in stretcher in room 56. Maintenance fluids started. Patient tachycardic to the 110s, all other VSS. IV patent and flowing. A&Ox4. Patient denies pain at this time. Ginger-jessica given to patient. Call bell given to patient and instructed to call RN If anything is needed. Stretcher in lowest position. Side rails up. Pending bed in CDU.
Report taken from Suze SAPP. states pt have a good night with no complaints. Will continue to monitor.
Pt received from SENAIT Gutierrez. Pt oriented to CDU & plan of care was discussed. Pt A&O x 4. Pt in CDU for frequent reeval, vitals per routine, serial abdominal exams, pain control, IVF, anti-emetics, IV steroids, IV protonix, GI following. Pt denies any nausea, vomiting, abdominal pain as of now. Pt abdomen soft, nondistended, nontender. V/S stable, pt afebrile,  IV in place, patent and free of signs of infiltration. Pt resting in bed. Safety & comfort measures maintained. Call bell in reach. Will continue to monitor.

## 2021-08-23 ENCOUNTER — APPOINTMENT (OUTPATIENT)
Dept: MAMMOGRAPHY | Facility: CLINIC | Age: 51
End: 2021-08-23

## 2021-08-23 ENCOUNTER — APPOINTMENT (OUTPATIENT)
Dept: ULTRASOUND IMAGING | Facility: CLINIC | Age: 51
End: 2021-08-23

## 2021-08-30 PROBLEM — K51.90 ULCERATIVE COLITIS, UNSPECIFIED, WITHOUT COMPLICATIONS: Chronic | Status: ACTIVE | Noted: 2021-06-22

## 2021-09-20 ENCOUNTER — APPOINTMENT (OUTPATIENT)
Dept: OBGYN | Facility: CLINIC | Age: 51
End: 2021-09-20
Payer: COMMERCIAL

## 2021-09-20 VITALS
BODY MASS INDEX: 26.87 KG/M2 | WEIGHT: 146 LBS | DIASTOLIC BLOOD PRESSURE: 84 MMHG | SYSTOLIC BLOOD PRESSURE: 135 MMHG | HEIGHT: 62 IN

## 2021-09-20 DIAGNOSIS — N95.1 MENOPAUSAL AND FEMALE CLIMACTERIC STATES: ICD-10-CM

## 2021-09-20 PROCEDURE — 99396 PREV VISIT EST AGE 40-64: CPT

## 2021-09-20 RX ORDER — VEDOLIZUMAB 300 MG/5ML
300 INJECTION, POWDER, LYOPHILIZED, FOR SOLUTION INTRAVENOUS
Refills: 0 | Status: ACTIVE | COMMUNITY
Start: 2021-09-20

## 2021-09-20 NOTE — HISTORY OF PRESENT ILLNESS
[FreeTextEntry1] : was hospitalized last year for bad UC flair [Mammogramdate] : 2021 [PapSmeardate] : [id number]

## 2021-09-22 LAB — HPV HIGH+LOW RISK DNA PNL CVX: NOT DETECTED

## 2021-09-24 LAB — CYTOLOGY CVX/VAG DOC THIN PREP: NORMAL

## 2022-01-14 ENCOUNTER — APPOINTMENT (OUTPATIENT)
Dept: OTOLARYNGOLOGY | Facility: CLINIC | Age: 52
End: 2022-01-14
Payer: COMMERCIAL

## 2022-01-14 VITALS — WEIGHT: 150 LBS | BODY MASS INDEX: 27.6 KG/M2 | HEIGHT: 62 IN | TEMPERATURE: 98.2 F

## 2022-01-14 PROCEDURE — 92557 COMPREHENSIVE HEARING TEST: CPT

## 2022-01-14 PROCEDURE — 31231 NASAL ENDOSCOPY DX: CPT

## 2022-01-14 PROCEDURE — 99204 OFFICE O/P NEW MOD 45 MIN: CPT | Mod: 25

## 2022-01-14 PROCEDURE — 92567 TYMPANOMETRY: CPT

## 2022-01-14 NOTE — PHYSICAL EXAM
[Normal] : mucosa is normal [Midline] : trachea located in midline position [de-identified] : b/l CI

## 2022-01-14 NOTE — REASON FOR VISIT
[Initial Consultation] : an initial consultation for [FreeTextEntry2] : broken nose, breathing issues

## 2022-01-14 NOTE — END OF VISIT
[FreeTextEntry3] : I personally saw and examined ANNAMARIE CHANDLER in detail. I spoke to KARISSA Shannno regarding the assessment and plan of care.  I preformed the procedures and I reviewed the above assessment and plan of care, and agree. I have made changes in changes in the body of the note where appropriate.\par \par

## 2022-01-14 NOTE — CONSULT LETTER
[Please see my note below.] : Please see my note below. [FreeTextEntry1] : Dear Dr. WALTER ORTEGA \par I had the pleasure of evaluating your patient ANNAMARIE CHANDLER, thank you for allowing us to participate in their care. please see full note detailing our visit below.\par If you have any questions, please do not hesitate to call me and I would be happy to discuss further. \par \par iNgel Welsh M.D.\par Attending Physician,  \par Department of Otolaryngology - Head and Neck Surgery\[facility] \par Office: [phone]\par Fax: [phone]\par \par

## 2022-01-14 NOTE — ASSESSMENT
[FreeTextEntry1] : 50 y/o F who presents with chronic sinus pressure, and nasal congestion. On exam with mild septal deviation and bilateral turbinate hypertrophy. \par Will start regiment to see if may improve symptoms and escalate if needed \par - Will start Flonase. A topical steroid reduce mucosal swelling, illustrated appropriate use and how to reduce the risk of bleeding \par - started on azelastine \par - Nasal irrigation and showed how to use it to maximize effectiveness \par next step will be sinusitis regiment and then possible CT if needed \par \par \par \par Pt also with Hearing loss, on exam b/l CI which was cleared \par audio done: \par Discussed with patient possible causes of asymmetry. Amongst the causes the most likely is a acute viral infection that decreased hearing in one ear. There is also a chance it may be caused but a lesion pushing on the nerve of hearing. This lesions is typically slow growing and can be detected with MRI or can be monitored with serial audiograms. if the hearing continued to decline we could then consider further workup. he elected to continue to watch hearing with serial audiograms.\par ear hygiene\par discussed preventive measures and signs of accumulation\par avoid q tips \par

## 2022-01-14 NOTE — HISTORY OF PRESENT ILLNESS
[de-identified] : 52 y/o F with intermittent chronic maxillary, and ethmoid sinus pressure x three years, lasts for hours, and will happen almost every week or every other week. Pt admits no sinus infection, no abx given for this. \par NO CT done \par when in her 20s had some nasal trauma, with nasal fx, had it fixed, then daughter bumped into her nose had some black eyes, didn’t have to get nose fixed. \par Now with constant b/l nasal obstruction L>R, pt has not tried any nasal sprays or saline. Will massage it a lot with mild relief, will take Tylenol due to the nasal pain. \par pt never been tested for allergies \par \par Pt also with some HL, feels not hearing as well, constantly asking people to repeat. \par occ tinnitus and otalgia not sure if its both, very intermittent last for seconds \par no Vertigo, drainage or facial weakness.\par \par

## 2022-01-14 NOTE — REVIEW OF SYSTEMS
[Hearing Loss] : hearing loss [Problem Snoring] : problem snoring [Sinus Pressure] : sinus pressure [Negative] : Heme/Lymph [As Noted in HPI] : as noted in HPI

## 2022-01-14 NOTE — PROCEDURE
[FreeTextEntry3] : Procedure- removal of cerumen bilaterally\par Diagnosis - cerumen impaction\par bilateral ears found to have impacted cerumen - they were cleared with suction and curette, canals appeared normal.\par  [FreeTextEntry6] : Procedure performed: Nasal Endoscopy- Diagnostic\par Pre-op indication(s): nasal congestion\par Post-op indication(s): nasal congestion \par Verbal and/or written consent obtained from patient\par Anterior rhinoscopy insufficient to account for symptoms\par Scope #: 3,  flexible fiber optic telescope \par The scope was introduced in the nasal passage between the middle and inferior turbinates to exam the inferior portion of the middle meatus and the fontanelle, as well as the maxillary ostia.  Next, the scope was passed medically and posteriorly to the middle turbinates to examine the sphenoethmoid recess and the superior turbinate region.\par Upon visualization the finders are as follows:\par Nasal Septum: left mild septal deviation\par Bilateral - Mucosa: boggy turbinates, Mucous: scant, Polyp: not seen, Inferior Turbinate: boggy, Middle Turbinate: BITH, Superior Turbinate: normal, Inferior Meatus: narrow, Middle Meatus: narrow, Super Meatus:normal, Sphenoethmoidal Recess: clear\par

## 2022-02-17 ENCOUNTER — APPOINTMENT (OUTPATIENT)
Dept: OTOLARYNGOLOGY | Facility: CLINIC | Age: 52
End: 2022-02-17
Payer: COMMERCIAL

## 2022-02-17 VITALS
DIASTOLIC BLOOD PRESSURE: 86 MMHG | SYSTOLIC BLOOD PRESSURE: 143 MMHG | WEIGHT: 150 LBS | BODY MASS INDEX: 27.6 KG/M2 | TEMPERATURE: 97.6 F | HEART RATE: 87 BPM | HEIGHT: 62 IN

## 2022-02-17 PROCEDURE — 31231 NASAL ENDOSCOPY DX: CPT

## 2022-02-17 PROCEDURE — 99214 OFFICE O/P EST MOD 30 MIN: CPT | Mod: 25

## 2022-02-17 NOTE — CONSULT LETTER
[Please see my note below.] : Please see my note below. [FreeTextEntry1] : Dear Dr. WALTER ORTEGA \par I had the pleasure of evaluating your patient ANNAMARIE CHANDLER, thank you for allowing us to participate in their care. please see full note detailing our visit below.\par If you have any questions, please do not hesitate to call me and I would be happy to discuss further. \par \par Nigel Welsh M.D.\par Attending Physician,  \par Department of Otolaryngology - Head and Neck Surgery\par [facility] \par Office: [phone]\par Fax: [phone]\par \par

## 2022-02-17 NOTE — HISTORY OF PRESENT ILLNESS
[de-identified] : 50 y/o F with intermittent chronic maxillary, and ethmoid sinus pressure x three years, lasts for hours, and will happen almost every week or every other week. Pt admits no sinus infection, no abx given for this. \par NO CT done \par when in her 20s had some nasal trauma, with nasal fx, had it fixed, then daughter bumped into her nose had some black eyes, didn’t have to get nose fixed. \par Now with constant b/l nasal obstruction L>R, pt has not tried any nasal sprays or saline. Will massage it a lot with mild relief, will take Tylenol due to the nasal pain. \par pt never been tested for allergies \par \par Pt also with some HL, feels not hearing as well, constantly asking people to repeat. \par occ tinnitus and otalgia not sure if its both, very intermittent last for seconds \par no Vertigo, drainage or facial weakness.\par \par  [FreeTextEntry1] : Pt with some possible mild improvement with nasal regiment but still with significant pain and discomfort \par feels has been getting worse mild relief nasal  sprays \par still with chronic right sided nasal obstruction with mucous drainage \par Pt was put on abx, and steroids x 1 month last spring, not able to tolerate it. \par

## 2022-02-17 NOTE — END OF VISIT
[FreeTextEntry3] : I personally saw and examined ANNAMARIE CHANDLER in detail. I spoke to KARISSA Shannon regarding the assessment and plan of care.  I preformed the procedures and I reviewed the above assessment and plan of care, and agree. I have made changes in changes in the body of the note where appropriate.\par \par

## 2022-02-17 NOTE — ASSESSMENT
[FreeTextEntry1] : 52 y/o F who presents with chronic sinus pressure, and nasal congestion. On exam with mild septal deviation and bilateral turbinate hypertrophy. \par Will start regiment to see if may improve symptoms and escalate if needed \par - Flonase. A topical steroid reduce mucosal swelling, illustrated appropriate use and how to reduce the risk of bleeding \par - started on azelastine \par - Nasal irrigation and showed how to use it to maximize effectiveness \par - We will proceed with a regiment of decongestants, antibiotics and irrigation to try to break the cycle of inflation and obstruction. this will treat the acute symptoms and will hopefully allow for maintenance therapy to reach disease areas and avoid further intervention.\par - ordered CT scan to access sinuses since shes not able to tolerate abx and steroids- will f/u at next visit to go over it and consider if would like to pursue any further options based on results. likely observe if clear, consider intervention if diseased. has been on ax and steroids since sx started, did not sure sx, does not tolerate steroids well and has close experience of complications of steroids and would like to avoid \par \par \par \par Pt also with Hearing loss asymmetric last visit, did not complain today, will repeat audio next visit \par Discussed with patient possible causes of asymmetry. Amongst the causes the most likely is a acute viral infection that decreased hearing in one ear. There is also a chance it may be caused but a lesion pushing on the nerve of hearing. This lesions is typically slow growing and can be detected with MRI or can be monitored with serial audiograms. if the hearing continued to decline we could then consider further workup. he elected to continue to watch hearing with serial audiograms.\par ear hygiene\par avoid q tips \par

## 2022-02-17 NOTE — REASON FOR VISIT
[Subsequent Evaluation] : a subsequent evaluation for [FreeTextEntry2] : broken nose, breathing issues

## 2022-02-17 NOTE — REVIEW OF SYSTEMS
[Hearing Loss] : hearing loss [As Noted in HPI] : as noted in HPI [Problem Snoring] : problem snoring [Sinus Pressure] : sinus pressure [Negative] : Heme/Lymph

## 2022-02-17 NOTE — PROCEDURE
[FreeTextEntry6] : Procedure performed: Nasal Endoscopy- Diagnostic\par Pre-op indication(s): nasal congestion\par Post-op indication(s): nasal congestion \par Verbal and/or written consent obtained from patient\par Anterior rhinoscopy insufficient to account for symptoms\par Scope #: 3,  flexible fiber optic telescope \par The scope was introduced in the nasal passage between the middle and inferior turbinates to exam the inferior portion of the middle meatus and the fontanelle, as well as the maxillary ostia.  Next, the scope was passed medically and posteriorly to the middle turbinates to examine the sphenoethmoid recess and the superior turbinate region.\par Upon visualization the finders are as follows:\par Nasal Septum: left mild septal deviation\par Bilateral - Mucosa: boggy turbinates, Mucous: scant, Polyp: not seen, Inferior Turbinate: boggy, Middle Turbinate: BITH, Superior Turbinate: normal, Inferior Meatus: narrow, Middle Meatus: narrow, Super Meatus:normal, Sphenoethmoidal Recess: clear\par

## 2022-03-10 NOTE — ED ADULT NURSE NOTE - MODE OF DISCHARGE
3/12/2022    Patient: Olimpia Jacobs   YOB: 1944   Date of Visit: 3/10/2022     Lasha Mariee MD  [address] [facility] [address]  Via Fax: [phone]    Dear Lasha Mariee MD,      Thank you for referring Mr. Fede Mcknight to [address] [place] [address] for evaluation. My notes for this consultation are attached. If you have questions, please do not hesitate to call me. I look forward to following your patient along with you.       Sincerely,    Kevin Donovan MD
Ambulatory

## 2022-04-13 ENCOUNTER — APPOINTMENT (OUTPATIENT)
Dept: MAMMOGRAPHY | Facility: CLINIC | Age: 52
End: 2022-04-13
Payer: COMMERCIAL

## 2022-04-13 ENCOUNTER — APPOINTMENT (OUTPATIENT)
Dept: CT IMAGING | Facility: CLINIC | Age: 52
End: 2022-04-13
Payer: COMMERCIAL

## 2022-04-13 ENCOUNTER — RESULT REVIEW (OUTPATIENT)
Age: 52
End: 2022-04-13

## 2022-04-13 ENCOUNTER — APPOINTMENT (OUTPATIENT)
Dept: ULTRASOUND IMAGING | Facility: CLINIC | Age: 52
End: 2022-04-13
Payer: COMMERCIAL

## 2022-04-13 ENCOUNTER — OUTPATIENT (OUTPATIENT)
Dept: OUTPATIENT SERVICES | Facility: HOSPITAL | Age: 52
LOS: 1 days | End: 2022-04-13
Payer: COMMERCIAL

## 2022-04-13 DIAGNOSIS — J34.89 OTHER SPECIFIED DISORDERS OF NOSE AND NASAL SINUSES: ICD-10-CM

## 2022-04-13 DIAGNOSIS — N95.1 MENOPAUSAL AND FEMALE CLIMACTERIC STATES: ICD-10-CM

## 2022-04-13 DIAGNOSIS — Z98.891 HISTORY OF UTERINE SCAR FROM PREVIOUS SURGERY: Chronic | ICD-10-CM

## 2022-04-13 DIAGNOSIS — Z00.8 ENCOUNTER FOR OTHER GENERAL EXAMINATION: ICD-10-CM

## 2022-04-13 PROCEDURE — 70486 CT MAXILLOFACIAL W/O DYE: CPT | Mod: 26

## 2022-04-13 PROCEDURE — 77067 SCR MAMMO BI INCL CAD: CPT | Mod: 26

## 2022-04-13 PROCEDURE — 76641 ULTRASOUND BREAST COMPLETE: CPT | Mod: 26,50

## 2022-04-13 PROCEDURE — 77063 BREAST TOMOSYNTHESIS BI: CPT | Mod: 26

## 2022-04-13 PROCEDURE — 77063 BREAST TOMOSYNTHESIS BI: CPT

## 2022-04-13 PROCEDURE — 76641 ULTRASOUND BREAST COMPLETE: CPT

## 2022-04-13 PROCEDURE — 77067 SCR MAMMO BI INCL CAD: CPT

## 2022-04-13 PROCEDURE — 70486 CT MAXILLOFACIAL W/O DYE: CPT

## 2022-04-14 ENCOUNTER — TRANSCRIPTION ENCOUNTER (OUTPATIENT)
Age: 52
End: 2022-04-14

## 2022-04-18 ENCOUNTER — NON-APPOINTMENT (OUTPATIENT)
Age: 52
End: 2022-04-18

## 2022-06-10 ENCOUNTER — APPOINTMENT (OUTPATIENT)
Dept: OTOLARYNGOLOGY | Facility: CLINIC | Age: 52
End: 2022-06-10
Payer: COMMERCIAL

## 2022-06-10 VITALS — BODY MASS INDEX: 27.6 KG/M2 | TEMPERATURE: 97.7 F | WEIGHT: 150 LBS | HEIGHT: 62 IN

## 2022-06-10 DIAGNOSIS — R09.81 NASAL CONGESTION: ICD-10-CM

## 2022-06-10 DIAGNOSIS — J34.89 OTHER SPECIFIED DISORDERS OF NOSE AND NASAL SINUSES: ICD-10-CM

## 2022-06-10 DIAGNOSIS — H91.93 UNSPECIFIED HEARING LOSS, BILATERAL: ICD-10-CM

## 2022-06-10 DIAGNOSIS — J34.3 HYPERTROPHY OF NASAL TURBINATES: ICD-10-CM

## 2022-06-10 PROCEDURE — 99214 OFFICE O/P EST MOD 30 MIN: CPT | Mod: 25

## 2022-06-10 PROCEDURE — 31231 NASAL ENDOSCOPY DX: CPT

## 2022-06-10 NOTE — REASON FOR VISIT
[Subsequent Evaluation] : a subsequent evaluation for [Hearing Loss] : hearing loss [FreeTextEntry2] : broken nose, breathing issues

## 2022-06-10 NOTE — ASSESSMENT
[FreeTextEntry1] : 52 y/o F who presents with chronic sinus pressure, and nasal congestion. On exam with mild septal deviation and bilateral turbinate hypertrophy. \par Will start regiment to see if may improve symptoms and escalate if needed \par - Flonase. A topical steroid reduce mucosal swelling, illustrated appropriate use and how to reduce the risk of bleeding \par - started on azelastine \par - Nasal irrigation and showed how to use it to maximize effectiveness \par - We will proceed with a regiment of decongestants, antibiotics and irrigation to try to break the cycle of inflation and obstruction. this will treat the acute symptoms and will hopefully allow for maintenance therapy to reach disease areas and avoid further intervention.\par - ordered CT scan to access sinuses since shes not able to tolerate abx and steroids- will f/u at next visit to go over it and consider if would like to pursue any further options based on results. likely observe if clear, consider intervention if diseased. has been on ax and steroids since sx started, did not sure sx, does not tolerate steroids well and has close experience of complications of steroids and would like to avoid \par discussed pain on bridge of the nose with glasses - more likely chronic pain from previous fracture \par at this point some disease on CT scan, improvement not durable on sinus Tx, bothersome sx\par would plan for right maxillary and frontal with right kaleb bulosa, pt aware of risk of failure \par \par \par \par Pt also with Hearing loss asymmetric last visit, did not complain today, will repeat audio next visit \par Discussed with patient possible causes of asymmetry. Amongst the causes the most likely is a acute viral infection that decreased hearing in one ear. There is also a chance it may be caused but a lesion pushing on the nerve of hearing. This lesions is typically slow growing and can be detected with MRI or can be monitored with serial audiograms. if the hearing continued to decline we could then consider further workup. he elected to continue to watch hearing with serial audiograms.\par ear hygiene\par avoid q tips \par

## 2022-06-10 NOTE — CONSULT LETTER
[Please see my note below.] : Please see my note below. [FreeTextEntry1] : Dear Dr. WALTER ORTEGA \par I had the pleasure of evaluating your patient ANNAMARIE CHANDLER, thank you for allowing us to participate in their care. please see full note detailing our visit below.\par If you have any questions, please do not hesitate to call me and I would be happy to discuss further. \par \par Nigel Welsh M.D.\par Attending Physician,  \par Department of Otolaryngology - Head and Neck Surgery\par [facility] \par Office: [phone]\par Fax: [phone]\par \par

## 2022-06-10 NOTE — HISTORY OF PRESENT ILLNESS
[de-identified] : 50 y/o F with intermittent chronic maxillary, and ethmoid sinus pressure x three years, lasts for hours, and will happen almost every week or every other week. Pt admits no sinus infection, no abx given for this. \par NO CT done \par when in her 20s had some nasal trauma, with nasal fx, had it fixed, then daughter bumped into her nose had some black eyes, didn’t have to get nose fixed. \par Now with constant b/l nasal obstruction L>R, pt has not tried any nasal sprays or saline. Will massage it a lot with mild relief, will take Tylenol due to the nasal pain. \par pt never been tested for allergies \par \par Pt also with some HL, feels not hearing as well, constantly asking people to repeat. \par occ tinnitus and otalgia not sure if its both, very intermittent last for seconds \par no Vertigo, drainage or facial weakness.\par \par  [FreeTextEntry1] : Pt with some possible mild improvement with nasal regiment but still with significant pain and discomfort  right side of face \par CT reviewed - some thickening right frontal and maxillary, right kaleb bulosa\par feels sprays not helping much \par still with chronic right sided nasal obstruction with mucous drainage \par \par

## 2022-06-10 NOTE — REVIEW OF SYSTEMS
[Negative] : Heme/Lymph [Hearing Loss] : hearing loss [As Noted in HPI] : as noted in HPI [Problem Snoring] : problem snoring [Sinus Pressure] : sinus pressure

## 2022-08-23 NOTE — ED PROVIDER NOTE - NS ED MD EM SELECTION
[id number] Comprehensive Bilobed Transposition Flap Text: The defect edges were debeveled with a #15 scalpel blade.  Given the location of the defect and the proximity to free margins a bilobed transposition flap was deemed most appropriate.  Using a sterile surgical marker, an appropriate bilobe flap drawn around the defect.    The area thus outlined was incised deep to adipose tissue with a #15 scalpel blade.  The skin margins were undermined to an appropriate distance in all directions utilizing iris scissors.

## 2023-03-22 ENCOUNTER — NON-APPOINTMENT (OUTPATIENT)
Age: 53
End: 2023-03-22

## 2023-03-22 ENCOUNTER — APPOINTMENT (OUTPATIENT)
Dept: INTERNAL MEDICINE | Facility: CLINIC | Age: 53
End: 2023-03-22
Payer: COMMERCIAL

## 2023-03-22 VITALS
WEIGHT: 160 LBS | HEIGHT: 62 IN | DIASTOLIC BLOOD PRESSURE: 78 MMHG | TEMPERATURE: 97.9 F | OXYGEN SATURATION: 99 % | BODY MASS INDEX: 29.44 KG/M2 | SYSTOLIC BLOOD PRESSURE: 124 MMHG | HEART RATE: 58 BPM

## 2023-03-22 DIAGNOSIS — Z00.00 ENCOUNTER FOR GENERAL ADULT MEDICAL EXAMINATION W/OUT ABNORMAL FINDINGS: ICD-10-CM

## 2023-03-22 DIAGNOSIS — F41.9 ANXIETY DISORDER, UNSPECIFIED: ICD-10-CM

## 2023-03-22 DIAGNOSIS — Z80.52 FAMILY HISTORY OF MALIGNANT NEOPLASM OF BLADDER: ICD-10-CM

## 2023-03-22 DIAGNOSIS — F32.A ANXIETY DISORDER, UNSPECIFIED: ICD-10-CM

## 2023-03-22 DIAGNOSIS — Z82.49 FAMILY HISTORY OF ISCHEMIC HEART DISEASE AND OTHER DISEASES OF THE CIRCULATORY SYSTEM: ICD-10-CM

## 2023-03-22 DIAGNOSIS — Z87.19 PERSONAL HISTORY OF OTHER DISEASES OF THE DIGESTIVE SYSTEM: ICD-10-CM

## 2023-03-22 DIAGNOSIS — R00.1 BRADYCARDIA, UNSPECIFIED: ICD-10-CM

## 2023-03-22 DIAGNOSIS — K51.90 ULCERATIVE COLITIS, UNSPECIFIED, W/OUT COMPLICATIONS: ICD-10-CM

## 2023-03-22 PROCEDURE — 99396 PREV VISIT EST AGE 40-64: CPT | Mod: 25

## 2023-03-22 PROCEDURE — 93000 ELECTROCARDIOGRAM COMPLETE: CPT

## 2023-03-22 RX ORDER — FLUTICASONE PROPIONATE 50 UG/1
50 SPRAY, METERED NASAL DAILY
Qty: 3 | Refills: 3 | Status: DISCONTINUED | COMMUNITY
Start: 2022-01-14 | End: 2023-03-22

## 2023-03-22 RX ORDER — METRONIDAZOLE 7.5 MG/G
CREAM TOPICAL
Refills: 0 | Status: ACTIVE | COMMUNITY

## 2023-03-22 NOTE — REVIEW OF SYSTEMS
[Negative] : Heme/Lymph [FreeTextEntry8] : episode of post-menopausal bleeding last yr-not evaluated by GYN

## 2023-03-22 NOTE — PHYSICAL EXAM
[No Abdominal Bruit] : a ~M bruit was not heard ~T in the abdomen [No Edema] : there was no peripheral edema [No Palpable Aorta] : no palpable aorta [Soft] : abdomen soft [Non Tender] : non-tender [No HSM] : no HSM [Normal] : normal gait, coordination grossly intact, no focal deficits and deep tendon reflexes were 2+ and symmetric [Normal Supraclavicular Nodes] : no supraclavicular lymphadenopathy [Normal Anterior Cervical Nodes] : no anterior cervical lymphadenopathy [Speech Grossly Normal] : speech grossly normal [Memory Grossly Normal] : memory grossly normal [Normal Affect] : the affect was normal [Alert and Oriented x3] : oriented to person, place, and time [de-identified] : deferred will see GYN for exam [de-identified] : healed abdominoplasty scarring

## 2023-03-22 NOTE — ASSESSMENT
[FreeTextEntry1] : Episode of post-menopausal bleeding 6 mos ago after 18 mos menopause - Advised to schedule updated GYN exam to discuss- also overdue for PAP.

## 2023-03-22 NOTE — HEALTH RISK ASSESSMENT
[Patient reported mammogram was normal] : Patient reported mammogram was normal [Patient reported PAP Smear was normal] : Patient reported PAP Smear was normal [Fully functional (bathing, dressing, toileting, transferring, walking, feeding)] : Fully functional (bathing, dressing, toileting, transferring, walking, feeding) [Fully functional (using the telephone, shopping, preparing meals, housekeeping, doing laundry, using] : Fully functional and needs no help or supervision to perform IADLs (using the telephone, shopping, preparing meals, housekeeping, doing laundry, using transportation, managing medications and managing finances) [Former] : Former [0-4] : 0-4 [> 15 Years] : > 15 Years [Yes] : Yes [Monthly or less (1 pt)] : Monthly or less (1 point) [1 or 2 (0 pts)] : 1 or 2 (0 points) [No falls in past year] : Patient reported no falls in the past year [1] : 1) Little interest or pleasure doing things for several days (1) [0] : 2) Feeling down, depressed, or hopeless: Not at all (0) [PHQ-2 Negative - No further assessment needed] : PHQ-2 Negative - No further assessment needed [] :  [[id number]] : 1 [Change in mental status noted] : No change in mental status noted [MammogramDate] : 4/13/22 [PapSmearDate] : 9/20/21 [ColonoscopyDate] : 2022 [ColonoscopyComments] : DR. Harrington-1 yr recall- [de-identified] : social smoker as a teenager

## 2023-03-22 NOTE — HISTORY OF PRESENT ILLNESS
[FreeTextEntry1] : Pt looking to get a physical today. [de-identified] : ANNAMARIE CHANDLER is a 53 year F who presents today to establish her care with [facility]. She had previously been getting her Primary Care form [facility] @ [place]. She is here today for an Annual Physical Exam. She is anxious as several unrelated friends were recently found to have occult cardiac disease. She has already scheduled a cardiac evaluation @ [place] later this Spring.\par She reports that she was started on Entyvio and ever since her UC has been under excellent control. \par

## 2023-03-24 LAB
ALBUMIN SERPL ELPH-MCNC: 4.7 G/DL
ALP BLD-CCNC: 76 U/L
ALT SERPL-CCNC: 16 U/L
ANION GAP SERPL CALC-SCNC: 10 MMOL/L
APPEARANCE: CLEAR
AST SERPL-CCNC: 23 U/L
BACTERIA: NEGATIVE
BASOPHILS # BLD AUTO: 0.07 K/UL
BASOPHILS NFR BLD AUTO: 1 %
BILIRUB SERPL-MCNC: 0.6 MG/DL
BILIRUBIN URINE: NEGATIVE
BLOOD URINE: NEGATIVE
BUN SERPL-MCNC: 14 MG/DL
CALCIUM SERPL-MCNC: 10.2 MG/DL
CHLORIDE SERPL-SCNC: 100 MMOL/L
CHOLEST SERPL-MCNC: 222 MG/DL
CO2 SERPL-SCNC: 27 MMOL/L
COLOR: COLORLESS
CREAT SERPL-MCNC: 0.75 MG/DL
EGFR: 95 ML/MIN/1.73M2
EOSINOPHIL # BLD AUTO: 0.1 K/UL
EOSINOPHIL NFR BLD AUTO: 1.5 %
GLUCOSE QUALITATIVE U: NEGATIVE
GLUCOSE SERPL-MCNC: 98 MG/DL
HCT VFR BLD CALC: 43 %
HDLC SERPL-MCNC: 69 MG/DL
HGB BLD-MCNC: 13.5 G/DL
HYALINE CASTS: 1 /LPF
IMM GRANULOCYTES NFR BLD AUTO: 0.1 %
KETONES URINE: NEGATIVE
LDLC SERPL CALC-MCNC: 136 MG/DL
LEUKOCYTE ESTERASE URINE: ABNORMAL
LYMPHOCYTES # BLD AUTO: 2.66 K/UL
LYMPHOCYTES NFR BLD AUTO: 39.5 %
MAN DIFF?: NORMAL
MCHC RBC-ENTMCNC: 29.9 PG
MCHC RBC-ENTMCNC: 31.4 GM/DL
MCV RBC AUTO: 95.1 FL
MICROSCOPIC-UA: NORMAL
MONOCYTES # BLD AUTO: 0.55 K/UL
MONOCYTES NFR BLD AUTO: 8.2 %
NEUTROPHILS # BLD AUTO: 3.35 K/UL
NEUTROPHILS NFR BLD AUTO: 49.7 %
NITRITE URINE: NEGATIVE
NONHDLC SERPL-MCNC: 152 MG/DL
PH URINE: 7
PLATELET # BLD AUTO: 250 K/UL
POTASSIUM SERPL-SCNC: 5.1 MMOL/L
PROT SERPL-MCNC: 6.8 G/DL
PROTEIN URINE: NEGATIVE
RBC # BLD: 4.52 M/UL
RBC # FLD: 13 %
RED BLOOD CELLS URINE: 3 /HPF
SODIUM SERPL-SCNC: 138 MMOL/L
SPECIFIC GRAVITY URINE: 1
SQUAMOUS EPITHELIAL CELLS: 1 /HPF
TRIGL SERPL-MCNC: 80 MG/DL
TSH SERPL-ACNC: 0.87 UIU/ML
UROBILINOGEN URINE: NORMAL
WBC # FLD AUTO: 6.74 K/UL
WHITE BLOOD CELLS URINE: 27 /HPF

## 2023-04-02 NOTE — ED PROVIDER NOTE - CROS ED RESP ALL NEG
A: intact  B: equal chest rise and breath sounds b/l; trachea midline  C: central pulses intact b/l; extremities warm  D: GCS 15 (E4 V5 M6); pupils 4mm equal and reactive b/l; moving extremities spontaneously   E: patient exposed and covered in warm blankets    Gen: no acute distress  Head: posterior scalp laceration  EENT: EOMI, 3mm equal b/l; c-collar in place  Lung: equal chest rise  CV: +femoral pulses b/l  Abd: pelvis stable; non-tender  MSK/skin: lacerations to left flank, left shoulder, left forearm, right upper abdomen   Neuro: GCS 15 negative...

## 2023-04-14 ENCOUNTER — NON-APPOINTMENT (OUTPATIENT)
Age: 53
End: 2023-04-14

## 2023-04-14 LAB — BACTERIA UR CULT: NORMAL

## 2023-07-24 ENCOUNTER — APPOINTMENT (OUTPATIENT)
Dept: OBGYN | Facility: CLINIC | Age: 53
End: 2023-07-24
Payer: COMMERCIAL

## 2023-07-24 VITALS
SYSTOLIC BLOOD PRESSURE: 135 MMHG | WEIGHT: 163 LBS | DIASTOLIC BLOOD PRESSURE: 85 MMHG | BODY MASS INDEX: 30 KG/M2 | HEIGHT: 62 IN

## 2023-07-24 DIAGNOSIS — Z01.419 ENCOUNTER FOR GYNECOLOGICAL EXAMINATION (GENERAL) (ROUTINE) W/OUT ABNORMAL FINDINGS: ICD-10-CM

## 2023-07-24 PROCEDURE — 99396 PREV VISIT EST AGE 40-64: CPT

## 2023-07-24 NOTE — HISTORY OF PRESENT ILLNESS
[FreeTextEntry1] : no issues  [Mammogramdate] : 2022 [PapSmeardate] : 2021 [TextBox_43] : regularly

## 2023-07-26 LAB — HPV HIGH+LOW RISK DNA PNL CVX: NOT DETECTED

## 2023-07-28 LAB — CYTOLOGY CVX/VAG DOC THIN PREP: NORMAL

## 2023-10-17 ENCOUNTER — INPATIENT (INPATIENT)
Facility: HOSPITAL | Age: 53
LOS: 3 days | Discharge: HOME | DRG: 683 | End: 2023-10-21
Attending: INTERNAL MEDICINE | Admitting: HOSPITALIST
Payer: COMMERCIAL

## 2023-10-17 VITALS
TEMPERATURE: 98 F | RESPIRATION RATE: 19 BRPM | HEART RATE: 58 BPM | OXYGEN SATURATION: 100 % | DIASTOLIC BLOOD PRESSURE: 86 MMHG | SYSTOLIC BLOOD PRESSURE: 146 MMHG

## 2023-10-17 DIAGNOSIS — Z98.891 HISTORY OF UTERINE SCAR FROM PREVIOUS SURGERY: ICD-10-CM

## 2023-10-17 DIAGNOSIS — Z98.891 HISTORY OF UTERINE SCAR FROM PREVIOUS SURGERY: Chronic | ICD-10-CM

## 2023-10-17 LAB
ADD ON TEST-SPECIMEN IN LAB: SIGNIFICANT CHANGE UP
ALBUMIN SERPL ELPH-MCNC: 3.6 G/DL — SIGNIFICANT CHANGE UP (ref 3.3–5)
ALBUMIN SERPL ELPH-MCNC: 3.6 G/DL — SIGNIFICANT CHANGE UP (ref 3.3–5)
ALP SERPL-CCNC: 102 U/L — SIGNIFICANT CHANGE UP (ref 40–120)
ALP SERPL-CCNC: 102 U/L — SIGNIFICANT CHANGE UP (ref 40–120)
ALT FLD-CCNC: 62 U/L — SIGNIFICANT CHANGE UP (ref 12–78)
ALT FLD-CCNC: 62 U/L — SIGNIFICANT CHANGE UP (ref 12–78)
ANION GAP SERPL CALC-SCNC: 6 MMOL/L — SIGNIFICANT CHANGE UP (ref 5–17)
ANION GAP SERPL CALC-SCNC: 6 MMOL/L — SIGNIFICANT CHANGE UP (ref 5–17)
APPEARANCE UR: CLEAR — SIGNIFICANT CHANGE UP
APPEARANCE UR: CLEAR — SIGNIFICANT CHANGE UP
APTT BLD: 30.8 SEC — SIGNIFICANT CHANGE UP (ref 24.5–35.6)
APTT BLD: 30.8 SEC — SIGNIFICANT CHANGE UP (ref 24.5–35.6)
AST SERPL-CCNC: 32 U/L — SIGNIFICANT CHANGE UP (ref 15–37)
AST SERPL-CCNC: 32 U/L — SIGNIFICANT CHANGE UP (ref 15–37)
BACTERIA # UR AUTO: NEGATIVE /HPF — SIGNIFICANT CHANGE UP
BACTERIA # UR AUTO: NEGATIVE /HPF — SIGNIFICANT CHANGE UP
BASOPHILS # BLD AUTO: 0.04 K/UL — SIGNIFICANT CHANGE UP (ref 0–0.2)
BASOPHILS # BLD AUTO: 0.04 K/UL — SIGNIFICANT CHANGE UP (ref 0–0.2)
BASOPHILS NFR BLD AUTO: 0.4 % — SIGNIFICANT CHANGE UP (ref 0–2)
BASOPHILS NFR BLD AUTO: 0.4 % — SIGNIFICANT CHANGE UP (ref 0–2)
BILIRUB SERPL-MCNC: 0.4 MG/DL — SIGNIFICANT CHANGE UP (ref 0.2–1.2)
BILIRUB SERPL-MCNC: 0.4 MG/DL — SIGNIFICANT CHANGE UP (ref 0.2–1.2)
BILIRUB UR-MCNC: NEGATIVE — SIGNIFICANT CHANGE UP
BILIRUB UR-MCNC: NEGATIVE — SIGNIFICANT CHANGE UP
BLD GP AB SCN SERPL QL: SIGNIFICANT CHANGE UP
BLD GP AB SCN SERPL QL: SIGNIFICANT CHANGE UP
BUN SERPL-MCNC: 63 MG/DL — HIGH (ref 7–23)
BUN SERPL-MCNC: 63 MG/DL — HIGH (ref 7–23)
CALCIUM SERPL-MCNC: 9.1 MG/DL — SIGNIFICANT CHANGE UP (ref 8.5–10.1)
CALCIUM SERPL-MCNC: 9.1 MG/DL — SIGNIFICANT CHANGE UP (ref 8.5–10.1)
CAST: 0 /LPF — SIGNIFICANT CHANGE UP (ref 0–4)
CAST: 0 /LPF — SIGNIFICANT CHANGE UP (ref 0–4)
CHLORIDE SERPL-SCNC: 106 MMOL/L — SIGNIFICANT CHANGE UP (ref 96–108)
CHLORIDE SERPL-SCNC: 106 MMOL/L — SIGNIFICANT CHANGE UP (ref 96–108)
CK SERPL-CCNC: 71 U/L — SIGNIFICANT CHANGE UP (ref 26–192)
CK SERPL-CCNC: 71 U/L — SIGNIFICANT CHANGE UP (ref 26–192)
CK SERPL-CCNC: 97 U/L — SIGNIFICANT CHANGE UP (ref 26–192)
CK SERPL-CCNC: 97 U/L — SIGNIFICANT CHANGE UP (ref 26–192)
CO2 SERPL-SCNC: 22 MMOL/L — SIGNIFICANT CHANGE UP (ref 22–31)
CO2 SERPL-SCNC: 22 MMOL/L — SIGNIFICANT CHANGE UP (ref 22–31)
COLOR SPEC: YELLOW — SIGNIFICANT CHANGE UP
COLOR SPEC: YELLOW — SIGNIFICANT CHANGE UP
CREAT SERPL-MCNC: 6.64 MG/DL — HIGH (ref 0.5–1.3)
CREAT SERPL-MCNC: 6.64 MG/DL — HIGH (ref 0.5–1.3)
DIFF PNL FLD: NEGATIVE — SIGNIFICANT CHANGE UP
DIFF PNL FLD: NEGATIVE — SIGNIFICANT CHANGE UP
EGFR: 7 ML/MIN/1.73M2 — LOW
EGFR: 7 ML/MIN/1.73M2 — LOW
EOSINOPHIL # BLD AUTO: 0.1 K/UL — SIGNIFICANT CHANGE UP (ref 0–0.5)
EOSINOPHIL # BLD AUTO: 0.1 K/UL — SIGNIFICANT CHANGE UP (ref 0–0.5)
EOSINOPHIL NFR BLD AUTO: 1.1 % — SIGNIFICANT CHANGE UP (ref 0–6)
EOSINOPHIL NFR BLD AUTO: 1.1 % — SIGNIFICANT CHANGE UP (ref 0–6)
GLUCOSE SERPL-MCNC: 112 MG/DL — HIGH (ref 70–99)
GLUCOSE SERPL-MCNC: 112 MG/DL — HIGH (ref 70–99)
GLUCOSE UR QL: NEGATIVE MG/DL — SIGNIFICANT CHANGE UP
GLUCOSE UR QL: NEGATIVE MG/DL — SIGNIFICANT CHANGE UP
HCT VFR BLD CALC: 38 % — SIGNIFICANT CHANGE UP (ref 34.5–45)
HCT VFR BLD CALC: 38 % — SIGNIFICANT CHANGE UP (ref 34.5–45)
HGB BLD-MCNC: 13.2 G/DL — SIGNIFICANT CHANGE UP (ref 11.5–15.5)
HGB BLD-MCNC: 13.2 G/DL — SIGNIFICANT CHANGE UP (ref 11.5–15.5)
IMM GRANULOCYTES NFR BLD AUTO: 0.2 % — SIGNIFICANT CHANGE UP (ref 0–0.9)
IMM GRANULOCYTES NFR BLD AUTO: 0.2 % — SIGNIFICANT CHANGE UP (ref 0–0.9)
INR BLD: 0.93 RATIO — SIGNIFICANT CHANGE UP (ref 0.85–1.18)
INR BLD: 0.93 RATIO — SIGNIFICANT CHANGE UP (ref 0.85–1.18)
KETONES UR-MCNC: NEGATIVE MG/DL — SIGNIFICANT CHANGE UP
KETONES UR-MCNC: NEGATIVE MG/DL — SIGNIFICANT CHANGE UP
LEUKOCYTE ESTERASE UR-ACNC: ABNORMAL
LEUKOCYTE ESTERASE UR-ACNC: ABNORMAL
LIDOCAIN IGE QN: 28 U/L — SIGNIFICANT CHANGE UP (ref 13–75)
LIDOCAIN IGE QN: 28 U/L — SIGNIFICANT CHANGE UP (ref 13–75)
LYMPHOCYTES # BLD AUTO: 1.31 K/UL — SIGNIFICANT CHANGE UP (ref 1–3.3)
LYMPHOCYTES # BLD AUTO: 1.31 K/UL — SIGNIFICANT CHANGE UP (ref 1–3.3)
LYMPHOCYTES # BLD AUTO: 14.2 % — SIGNIFICANT CHANGE UP (ref 13–44)
LYMPHOCYTES # BLD AUTO: 14.2 % — SIGNIFICANT CHANGE UP (ref 13–44)
MAGNESIUM SERPL-MCNC: 2.3 MG/DL — SIGNIFICANT CHANGE UP (ref 1.6–2.6)
MAGNESIUM SERPL-MCNC: 2.3 MG/DL — SIGNIFICANT CHANGE UP (ref 1.6–2.6)
MCHC RBC-ENTMCNC: 31.2 PG — SIGNIFICANT CHANGE UP (ref 27–34)
MCHC RBC-ENTMCNC: 31.2 PG — SIGNIFICANT CHANGE UP (ref 27–34)
MCHC RBC-ENTMCNC: 34.7 GM/DL — SIGNIFICANT CHANGE UP (ref 32–36)
MCHC RBC-ENTMCNC: 34.7 GM/DL — SIGNIFICANT CHANGE UP (ref 32–36)
MCV RBC AUTO: 89.8 FL — SIGNIFICANT CHANGE UP (ref 80–100)
MCV RBC AUTO: 89.8 FL — SIGNIFICANT CHANGE UP (ref 80–100)
MONOCYTES # BLD AUTO: 1.05 K/UL — HIGH (ref 0–0.9)
MONOCYTES # BLD AUTO: 1.05 K/UL — HIGH (ref 0–0.9)
MONOCYTES NFR BLD AUTO: 11.4 % — SIGNIFICANT CHANGE UP (ref 2–14)
MONOCYTES NFR BLD AUTO: 11.4 % — SIGNIFICANT CHANGE UP (ref 2–14)
NEUTROPHILS # BLD AUTO: 6.71 K/UL — SIGNIFICANT CHANGE UP (ref 1.8–7.4)
NEUTROPHILS # BLD AUTO: 6.71 K/UL — SIGNIFICANT CHANGE UP (ref 1.8–7.4)
NEUTROPHILS NFR BLD AUTO: 72.7 % — SIGNIFICANT CHANGE UP (ref 43–77)
NEUTROPHILS NFR BLD AUTO: 72.7 % — SIGNIFICANT CHANGE UP (ref 43–77)
NITRITE UR-MCNC: NEGATIVE — SIGNIFICANT CHANGE UP
NITRITE UR-MCNC: NEGATIVE — SIGNIFICANT CHANGE UP
NT-PROBNP SERPL-SCNC: 576 PG/ML — HIGH (ref 0–125)
NT-PROBNP SERPL-SCNC: 576 PG/ML — HIGH (ref 0–125)
PH UR: 5.5 — SIGNIFICANT CHANGE UP (ref 5–8)
PH UR: 5.5 — SIGNIFICANT CHANGE UP (ref 5–8)
PHOSPHATE SERPL-MCNC: 4.5 MG/DL — SIGNIFICANT CHANGE UP (ref 2.5–4.5)
PHOSPHATE SERPL-MCNC: 4.5 MG/DL — SIGNIFICANT CHANGE UP (ref 2.5–4.5)
PLATELET # BLD AUTO: 178 K/UL — SIGNIFICANT CHANGE UP (ref 150–400)
PLATELET # BLD AUTO: 178 K/UL — SIGNIFICANT CHANGE UP (ref 150–400)
POTASSIUM SERPL-MCNC: 5.3 MMOL/L — SIGNIFICANT CHANGE UP (ref 3.5–5.3)
POTASSIUM SERPL-MCNC: 5.3 MMOL/L — SIGNIFICANT CHANGE UP (ref 3.5–5.3)
POTASSIUM SERPL-SCNC: 5.3 MMOL/L — SIGNIFICANT CHANGE UP (ref 3.5–5.3)
POTASSIUM SERPL-SCNC: 5.3 MMOL/L — SIGNIFICANT CHANGE UP (ref 3.5–5.3)
PROT SERPL-MCNC: 7.3 GM/DL — SIGNIFICANT CHANGE UP (ref 6–8.3)
PROT SERPL-MCNC: 7.3 GM/DL — SIGNIFICANT CHANGE UP (ref 6–8.3)
PROT UR-MCNC: NEGATIVE MG/DL — SIGNIFICANT CHANGE UP
PROT UR-MCNC: NEGATIVE MG/DL — SIGNIFICANT CHANGE UP
PROTHROM AB SERPL-ACNC: 10.5 SEC — SIGNIFICANT CHANGE UP (ref 9.5–13)
PROTHROM AB SERPL-ACNC: 10.5 SEC — SIGNIFICANT CHANGE UP (ref 9.5–13)
RBC # BLD: 4.23 M/UL — SIGNIFICANT CHANGE UP (ref 3.8–5.2)
RBC # BLD: 4.23 M/UL — SIGNIFICANT CHANGE UP (ref 3.8–5.2)
RBC # FLD: 12.5 % — SIGNIFICANT CHANGE UP (ref 10.3–14.5)
RBC # FLD: 12.5 % — SIGNIFICANT CHANGE UP (ref 10.3–14.5)
RBC CASTS # UR COMP ASSIST: 0 /HPF — SIGNIFICANT CHANGE UP (ref 0–4)
RBC CASTS # UR COMP ASSIST: 0 /HPF — SIGNIFICANT CHANGE UP (ref 0–4)
SODIUM SERPL-SCNC: 134 MMOL/L — LOW (ref 135–145)
SODIUM SERPL-SCNC: 134 MMOL/L — LOW (ref 135–145)
SP GR SPEC: <1.005 — LOW (ref 1–1.03)
SP GR SPEC: <1.005 — LOW (ref 1–1.03)
SQUAMOUS # UR AUTO: 2 /HPF — SIGNIFICANT CHANGE UP (ref 0–5)
SQUAMOUS # UR AUTO: 2 /HPF — SIGNIFICANT CHANGE UP (ref 0–5)
UROBILINOGEN FLD QL: 0.2 MG/DL — SIGNIFICANT CHANGE UP (ref 0.2–1)
UROBILINOGEN FLD QL: 0.2 MG/DL — SIGNIFICANT CHANGE UP (ref 0.2–1)
WBC # BLD: 9.23 K/UL — SIGNIFICANT CHANGE UP (ref 3.8–10.5)
WBC # BLD: 9.23 K/UL — SIGNIFICANT CHANGE UP (ref 3.8–10.5)
WBC # FLD AUTO: 9.23 K/UL — SIGNIFICANT CHANGE UP (ref 3.8–10.5)
WBC # FLD AUTO: 9.23 K/UL — SIGNIFICANT CHANGE UP (ref 3.8–10.5)
WBC UR QL: 6 /HPF — HIGH (ref 0–5)
WBC UR QL: 6 /HPF — HIGH (ref 0–5)

## 2023-10-17 PROCEDURE — 90686 IIV4 VACC NO PRSV 0.5 ML IM: CPT

## 2023-10-17 PROCEDURE — 84300 ASSAY OF URINE SODIUM: CPT

## 2023-10-17 PROCEDURE — 83880 ASSAY OF NATRIURETIC PEPTIDE: CPT

## 2023-10-17 PROCEDURE — 99223 1ST HOSP IP/OBS HIGH 75: CPT

## 2023-10-17 PROCEDURE — 74176 CT ABD & PELVIS W/O CONTRAST: CPT | Mod: 26,MA

## 2023-10-17 PROCEDURE — 76770 US EXAM ABDO BACK WALL COMP: CPT

## 2023-10-17 PROCEDURE — 90471 IMMUNIZATION ADMIN: CPT

## 2023-10-17 PROCEDURE — 76705 ECHO EXAM OF ABDOMEN: CPT | Mod: 26

## 2023-10-17 PROCEDURE — 85027 COMPLETE CBC AUTOMATED: CPT

## 2023-10-17 PROCEDURE — 93306 TTE W/DOPPLER COMPLETE: CPT

## 2023-10-17 PROCEDURE — 99497 ADVNCD CARE PLAN 30 MIN: CPT | Mod: 25

## 2023-10-17 PROCEDURE — 84550 ASSAY OF BLOOD/URIC ACID: CPT

## 2023-10-17 PROCEDURE — 82550 ASSAY OF CK (CPK): CPT

## 2023-10-17 PROCEDURE — 84100 ASSAY OF PHOSPHORUS: CPT

## 2023-10-17 PROCEDURE — 78226 HEPATOBILIARY SYSTEM IMAGING: CPT

## 2023-10-17 PROCEDURE — 70450 CT HEAD/BRAIN W/O DYE: CPT | Mod: 26,MA

## 2023-10-17 PROCEDURE — 80048 BASIC METABOLIC PNL TOTAL CA: CPT

## 2023-10-17 PROCEDURE — 93005 ELECTROCARDIOGRAM TRACING: CPT

## 2023-10-17 PROCEDURE — 36415 COLL VENOUS BLD VENIPUNCTURE: CPT

## 2023-10-17 PROCEDURE — 81001 URINALYSIS AUTO W/SCOPE: CPT

## 2023-10-17 PROCEDURE — 84156 ASSAY OF PROTEIN URINE: CPT

## 2023-10-17 PROCEDURE — 99291 CRITICAL CARE FIRST HOUR: CPT

## 2023-10-17 PROCEDURE — A9537: CPT

## 2023-10-17 PROCEDURE — 85025 COMPLETE CBC W/AUTO DIFF WBC: CPT

## 2023-10-17 PROCEDURE — 82570 ASSAY OF URINE CREATININE: CPT

## 2023-10-17 PROCEDURE — 83735 ASSAY OF MAGNESIUM: CPT

## 2023-10-17 PROCEDURE — 83935 ASSAY OF URINE OSMOLALITY: CPT

## 2023-10-17 RX ORDER — LIDOCAINE 4 G/100G
1 CREAM TOPICAL DAILY
Refills: 0 | Status: DISCONTINUED | OUTPATIENT
Start: 2023-10-17 | End: 2023-10-21

## 2023-10-17 RX ORDER — ACETAMINOPHEN 500 MG
1000 TABLET ORAL ONCE
Refills: 0 | Status: COMPLETED | OUTPATIENT
Start: 2023-10-17 | End: 2023-10-17

## 2023-10-17 RX ORDER — CITALOPRAM 10 MG/1
1 TABLET, FILM COATED ORAL
Qty: 0 | Refills: 0 | DISCHARGE

## 2023-10-17 RX ORDER — SENNA PLUS 8.6 MG/1
2 TABLET ORAL AT BEDTIME
Refills: 0 | Status: DISCONTINUED | OUTPATIENT
Start: 2023-10-17 | End: 2023-10-21

## 2023-10-17 RX ORDER — VEDOLIZUMAB 108 MG/.68ML
300 INJECTION, SOLUTION SUBCUTANEOUS
Refills: 0 | DISCHARGE

## 2023-10-17 RX ORDER — DEXMETHYLPHENIDATE HYDROCHLORIDE 35 MG/1
1 CAPSULE, EXTENDED RELEASE ORAL
Refills: 0 | DISCHARGE

## 2023-10-17 RX ORDER — LANOLIN ALCOHOL/MO/W.PET/CERES
3 CREAM (GRAM) TOPICAL AT BEDTIME
Refills: 0 | Status: DISCONTINUED | OUTPATIENT
Start: 2023-10-17 | End: 2023-10-21

## 2023-10-17 RX ORDER — HYDROMORPHONE HYDROCHLORIDE 2 MG/ML
0.5 INJECTION INTRAMUSCULAR; INTRAVENOUS; SUBCUTANEOUS EVERY 4 HOURS
Refills: 0 | Status: DISCONTINUED | OUTPATIENT
Start: 2023-10-17 | End: 2023-10-20

## 2023-10-17 RX ORDER — INFLUENZA VIRUS VACCINE 15; 15; 15; 15 UG/.5ML; UG/.5ML; UG/.5ML; UG/.5ML
0.5 SUSPENSION INTRAMUSCULAR ONCE
Refills: 0 | Status: COMPLETED | OUTPATIENT
Start: 2023-10-17 | End: 2023-10-21

## 2023-10-17 RX ORDER — NALOXONE HYDROCHLORIDE 4 MG/.1ML
0.4 SPRAY NASAL ONCE
Refills: 0 | Status: DISCONTINUED | OUTPATIENT
Start: 2023-10-17 | End: 2023-10-21

## 2023-10-17 RX ORDER — SODIUM CHLORIDE 9 MG/ML
1000 INJECTION INTRAMUSCULAR; INTRAVENOUS; SUBCUTANEOUS ONCE
Refills: 0 | Status: COMPLETED | OUTPATIENT
Start: 2023-10-17 | End: 2023-10-17

## 2023-10-17 RX ORDER — SODIUM CHLORIDE 9 MG/ML
1000 INJECTION INTRAMUSCULAR; INTRAVENOUS; SUBCUTANEOUS
Refills: 0 | Status: DISCONTINUED | OUTPATIENT
Start: 2023-10-17 | End: 2023-10-21

## 2023-10-17 RX ORDER — POLYETHYLENE GLYCOL 3350 17 G/17G
17 POWDER, FOR SOLUTION ORAL DAILY
Refills: 0 | Status: DISCONTINUED | OUTPATIENT
Start: 2023-10-17 | End: 2023-10-21

## 2023-10-17 RX ORDER — CITALOPRAM 10 MG/1
20 TABLET, FILM COATED ORAL DAILY
Refills: 0 | Status: DISCONTINUED | OUTPATIENT
Start: 2023-10-17 | End: 2023-10-21

## 2023-10-17 RX ORDER — ONDANSETRON 8 MG/1
4 TABLET, FILM COATED ORAL ONCE
Refills: 0 | Status: COMPLETED | OUTPATIENT
Start: 2023-10-17 | End: 2023-10-17

## 2023-10-17 RX ORDER — METRONIDAZOLE 7.5 MG/G
1 GEL VAGINAL
Refills: 0 | DISCHARGE

## 2023-10-17 RX ORDER — MORPHINE SULFATE 50 MG/1
4 CAPSULE, EXTENDED RELEASE ORAL ONCE
Refills: 0 | Status: DISCONTINUED | OUTPATIENT
Start: 2023-10-17 | End: 2023-10-17

## 2023-10-17 RX ORDER — HYDROMORPHONE HYDROCHLORIDE 2 MG/ML
0.2 INJECTION INTRAMUSCULAR; INTRAVENOUS; SUBCUTANEOUS EVERY 4 HOURS
Refills: 0 | Status: DISCONTINUED | OUTPATIENT
Start: 2023-10-17 | End: 2023-10-20

## 2023-10-17 RX ORDER — VALACYCLOVIR 500 MG/1
1000 TABLET, FILM COATED ORAL DAILY
Refills: 0 | Status: DISCONTINUED | OUTPATIENT
Start: 2023-10-17 | End: 2023-10-21

## 2023-10-17 RX ORDER — ACETAMINOPHEN 500 MG
650 TABLET ORAL EVERY 6 HOURS
Refills: 0 | Status: DISCONTINUED | OUTPATIENT
Start: 2023-10-17 | End: 2023-10-21

## 2023-10-17 RX ORDER — VALACYCLOVIR 500 MG/1
1 TABLET, FILM COATED ORAL
Refills: 0 | DISCHARGE

## 2023-10-17 RX ORDER — ONDANSETRON 8 MG/1
4 TABLET, FILM COATED ORAL EVERY 6 HOURS
Refills: 0 | Status: DISCONTINUED | OUTPATIENT
Start: 2023-10-17 | End: 2023-10-21

## 2023-10-17 RX ADMIN — ONDANSETRON 4 MILLIGRAM(S): 8 TABLET, FILM COATED ORAL at 21:52

## 2023-10-17 RX ADMIN — MORPHINE SULFATE 4 MILLIGRAM(S): 50 CAPSULE, EXTENDED RELEASE ORAL at 19:28

## 2023-10-17 RX ADMIN — Medication 650 MILLIGRAM(S): at 18:45

## 2023-10-17 RX ADMIN — Medication 0.2 MILLIGRAM(S): at 23:19

## 2023-10-17 RX ADMIN — Medication 400 MILLIGRAM(S): at 13:25

## 2023-10-17 RX ADMIN — MORPHINE SULFATE 4 MILLIGRAM(S): 50 CAPSULE, EXTENDED RELEASE ORAL at 15:10

## 2023-10-17 RX ADMIN — SODIUM CHLORIDE 2000 MILLILITER(S): 9 INJECTION INTRAMUSCULAR; INTRAVENOUS; SUBCUTANEOUS at 13:25

## 2023-10-17 RX ADMIN — MORPHINE SULFATE 4 MILLIGRAM(S): 50 CAPSULE, EXTENDED RELEASE ORAL at 14:54

## 2023-10-17 RX ADMIN — Medication 1000 MILLIGRAM(S): at 14:25

## 2023-10-17 NOTE — H&P ADULT - HISTORY OF PRESENT ILLNESS
53-year-old female with past medical history of ulcerative colitis presents with abdominal pain. Patient states that Friday night she woke up and had a headache.  At that time she noticed that she had some epigastric pain and body aches.  She slept the whole day on Saturday and Sunday.  States that she felt weird, "loopy."  She had continued body aches, felt dizzy/lightheaded as if she was on a boat.  She took some [place] and Advil which did not relieve her symptoms.  She has continued upper abdominal pain in the epigastric region of the right upper quadrant.  Also reports decreased appetite and shortness of breath.  Of note, patient states that she had a cardiac work-up a few months ago including a stress test, echocardiogram and Holter monitor which were reported to be normal.  She has yearly colonoscopies and is due for 8 upcoming colonoscopy before January 2024.  She is receiving Entyvio infusions every 8 weeks for her ulcerative colitis.  She has been taking workout supplements called [employer] recently and started at 45 workout.  She states that her exercise has increased recently, but she was working out with beach body previously. Denies fevers, chills, URI symptoms, chest pain, nausea, diarrhea, constipation.  She had 1 episode of vomiting upon my evaluation.    ER course: VSS.  Labs: Creatinine 6.64 (baseline ~0.5), glucose 112. UA: small leukocyte esterase.     EKG: pending, f/u result     Imaging:   - CT abd/pelvis: Trace gallbladder mural thickening/pericholecystic fluid. Small right pleural effusion and small volume pelvic fluid. Cannot exclude mural thickening involving the descending colon. 4 mm subpleural nodular opacity left lung base.   - RUQ US: Thickened edematous gallbladder wall with a small amount of pericholecystic fluid; appearance of the gallbladder can be secondary to underlying gallbladder or liver disease. No gallstones or tenderness over the gallbladder at the time the study.  - CT head: Unremarkable noncontrast head CT.    Pt was given 1L of NS, Ofirmev, Morphine. She is being admitted to med/surg for further management.

## 2023-10-17 NOTE — CONSULT NOTE ADULT - ASSESSMENT
52 yo fem UC on remission, abd pain, acute renal failure Creat 6  -Recommend HIDA scan , no gallstones on US  -Medicine admission  -Nephrology consult  -Rhabdo? 52 yo fem UC on remission, abd pain, acute renal failure Creat 6  -Recommend HIDA scan , no gallstones on US  -Medicine admission  -Nephrology consult  -Diffuse muscle tenderness, Myositis?

## 2023-10-17 NOTE — ED STATDOCS - OBJECTIVE STATEMENT
54 yo female w/PMHx ulcerative colitis presents to the ED c/o abdominal pain and HA. Pt states she had a HA on Friday night and abdominal pain that has not subsided since. Pt took medication with relief and reports she slept all day Saturday. Pt feels dehydrated. States she feels dizzy "like she is drunk" since Saturday. Pt with no vomiting. Dr. Markham PCP. Pt with no fevers.

## 2023-10-17 NOTE — ED ADULT NURSE NOTE - NSFALLUNIVINTERV_ED_ALL_ED
Bed/Stretcher in lowest position, wheels locked, appropriate side rails in place/Call bell, personal items and telephone in reach/Instruct patient to call for assistance before getting out of bed/chair/stretcher/Non-slip footwear applied when patient is off stretcher/[place] to call system/Physically safe environment - no spills, clutter or unnecessary equipment/Purposeful proactive rounding/Room/bathroom lighting operational, light cord in reach

## 2023-10-17 NOTE — ED ADULT NURSE NOTE - OBJECTIVE STATEMENT
Pt A&Ox4, presents to the ED c/o generalized abdominal pain, weakness, and dizziness x5 days. Denies n/v/d, fevers. PMH of ulcerative colitis, under control. No medication PTA.

## 2023-10-17 NOTE — ED STATDOCS - CRITICAL CARE ATTENDING CONTRIBUTION TO CARE
Elements for critical care include direct patient care (not related to procedure), additional history taking, interpretation of diagnostic studies, documentation, consultation with other physicians    This was a BYRON shared visit    I, Geine Gabriel MD, personally saw the patient with BYRON.  I have personally performed a face to face diagnostic evaluation on this patient.  I have reviewed the BYRON note and agree with the history, exam, and plan of care, except as noted.  I personally saw the patient and performed a substantive portion of the visit including all aspects of the medical decision making.

## 2023-10-17 NOTE — H&P ADULT - NSHPPHYSICALEXAM_GEN_ALL_CORE
ICU Vital Signs Last 24 Hrs  T(C): 36.7 (17 Oct 2023 19:15), Max: 36.8 (17 Oct 2023 15:22)  T(F): 98.1 (17 Oct 2023 19:15), Max: 98.2 (17 Oct 2023 15:22)  HR: 67 (17 Oct 2023 20:21) (58 - 67)  BP: 133/98 (17 Oct 2023 20:21) (133/98 - 153/108)  BP(mean): 109 (17 Oct 2023 20:21) (97 - 117)  RR: 16 (17 Oct 2023 19:15) (16 - 19)  SpO2: 100% (17 Oct 2023 19:15) (98% - 100%)    O2 Parameters below as of 17 Oct 2023 19:15  Patient On (Oxygen Delivery Method): room air    General: Awake and alert, cooperative with exam. No acute distress.   Skin: Warm, dry, and pink.   Eyes: Pupils equal and reactive to light. Extraocular eye movements intact. No conjunctival injection, discharge, or scleral icterus.   HEENT: Atraumatic, normocephalic. Moist mucus membranes.   Cardiology: Normal S1, S2. No murmurs, rubs, or gallops. Regular rate and rhythm.   Respiratory: Lungs clear to ascultation bilaterally. Good air exchange. No wheezes, rales, or rhonchi. Normal chest expansion.   Gastrointestinal: Positive bowel sounds. Soft. No guarding, rigidity, or rebound tenderness. No hepatosplenomegaly. + abd distension, + epigastric and RUQ pain. + Santana's sign.   Musculoskeletal: 5/5 motor strength in all extremities. Normal range of motion.   Extremities: No peripheral edema bilaterally. Dorsalis pedis pulses 2+ bilaterally.   Neurological: A+Ox3 (person, place, and time). Cranial nerves 2-12 intact. Normal speech. No facial droop. No focal neurological deficits.   Psychiatric: Normal affect. Normal mood.

## 2023-10-17 NOTE — H&P ADULT - ASSESSMENT
54 y/o F presents with abdominal pain     1. Acute kidney injury likely renal etiology possible ATN in the setting of workout supplements r/o rhabdomyolysis   - Admit to med/surg   - Cr 6.64 (baseline ~0.5), monitor closely  - BUN/Cr ratio = 9 -> suggests intrinsic renal disase    - s/p 1L of NS, c/w IVF at 100 ml/hr (monitor for fluid overload)   - Avoid nephrotoxic medications   - Strict I+Os   - f/u CK level, renal US   - Nephrology consult - Dr. Simms     2. Trace gallbladder mural thickening/pericholecystic fluid  - Ordered HIDA scan   - Pt does not have any CBD dilation on imaging, no fever, no leukocytosis but + Santana's sign   - Pain control   - Anti-emetics   - Clear liquid diet for now; advance as tolerated   - Surgery recs appreciated     3. Small right pleural effusion and small volume pelvic fluid.   - Ordered BNP     4. Cannot exclude mural thickening involving the descending colon  - Outpt f/u with GI for colonoscopy (scheduled at the end of the year outpt)     5. 4 mm subpleural nodular opacity left lung base  - f/u with CT chest outpt     6. History of ulcerative colitis  - c/w home medications; verified with pt at the bedside    DVT ppx: SCDs, encourage ambulation   Code status: Full code   Emergency contact: Rene Madden () [phone]     I spent a total of 80 minutes on the date of this encounter coordinating the patient's care. This includes reviewing prior documentation, results and imaging in addition to completing a full history and physical examination on the patient. Further tests, medications, and procedures have been ordered as indicated. Laboratory results and the plan of care were communicated to the patient and/or their family member. Supporting documentation was completed and added to the patient's chart.

## 2023-10-17 NOTE — ED STATDOCS - PHYSICAL EXAMINATION
Constitutional: NAD AAOx3, airway, patent, face mask intact  Eyes: EOMI, pupils equal  Head: Normocephalic atraumatic  Mouth: no airway obstruction  Cardiac: regular rate   Resp: Lungs CTAB  GI: Abd s/nt/nd, ttp RUQ  ttp ruq   Neuro: CN2-12 intact  Skin: No rashes

## 2023-10-17 NOTE — ED STATDOCS - PROGRESS NOTE DETAILS
52 yo female w/PMHx ulcerative colitis presents to the ED c/o abdominal pain and HA. Pt states she had a HA on Friday night and abdominal pain that has not subsided since. Pt took medication with relief and reports she slept all day Saturday. Pt feels dehydrated. States she feels dizzy "like she is drunk" since Saturday. Pt with no vomiting. Dr. Markham PCP. Pt with no fevers. Pt. is a 53 year old female Hx ulcerative colitis on Humira.  presents with abdominal pain and headache.  Pt. states she started feeling dizzy and "drunk" three days ago.  Pt. feeling dehydrated.  Pt. started taking energy drinks and a "supplement" before she goes to the [facility].  Last dose 3 days ago.  Neg. abdominal surgical history.  Karen John PA-C Dr. Carey evaluated patient and HIDA scan ordered.  Creatinine 6.  Will admit to medicine.  Karen John PA-C Dr. Simms, renal aware of admission.  Karen John PA-C

## 2023-10-17 NOTE — ED ADULT TRIAGE NOTE - CHIEF COMPLAINT QUOTE
pt presents to ED from urgent care for RUQ abdomina pain. c/o abdominal pain, generalized weakness, dizziness since Friday. denies n/v/d, fever.

## 2023-10-17 NOTE — H&P ADULT - NSHPREVIEWOFSYSTEMS_GEN_ALL_CORE
Constitutional: negative for fatigue, negative for fever, negative for chills, positive for decreased appetite.  Skin: negative for rashes, negative for open wounds, negative for jaundice.   Eyes: negative for blurry vision, negative for double vision.   Ears, nose, throat: negative for ear pain, negative for nasal congestion, negative for sore throat, negative for lymph node swelling.   Cardiovascular: negative for chest pain, negative for palpitations, negative for lower extremity swelling.   Respiratory: positive for shortness of breath, negative for wheezing, negative for cough.   Gastrointestinal: positive for abdominal pain, negative for nausea, positive for vomiting, negative for diarrhea, negative for constipation, negative for blood in the stool, negative for black tarry stools.   Genitourinary: negative for burning on urination, negative for urinary urgency or frequency, negative for blood in the urine.   Endocrine: negative for cold intolerance, negative for heat intolerance, negative for increased thirst.   Hematologic: negative for easy bruising or bleeding.   Musculoskeletal: negative for muscle/joint pain, negative for decreased range of motion.   Neurological: positive for dizziness, negative for headaches, negative for loss of consciousness, negative for motor weakness, negative for sensory deficits.   Psychiatric: negative for depression, negative for anxiety.

## 2023-10-18 LAB
ANION GAP SERPL CALC-SCNC: 6 MMOL/L — SIGNIFICANT CHANGE UP (ref 5–17)
ANION GAP SERPL CALC-SCNC: 6 MMOL/L — SIGNIFICANT CHANGE UP (ref 5–17)
BASOPHILS # BLD AUTO: 0.03 K/UL — SIGNIFICANT CHANGE UP (ref 0–0.2)
BASOPHILS # BLD AUTO: 0.03 K/UL — SIGNIFICANT CHANGE UP (ref 0–0.2)
BASOPHILS NFR BLD AUTO: 0.4 % — SIGNIFICANT CHANGE UP (ref 0–2)
BASOPHILS NFR BLD AUTO: 0.4 % — SIGNIFICANT CHANGE UP (ref 0–2)
BUN SERPL-MCNC: 54 MG/DL — HIGH (ref 7–23)
BUN SERPL-MCNC: 54 MG/DL — HIGH (ref 7–23)
CALCIUM SERPL-MCNC: 8.3 MG/DL — LOW (ref 8.5–10.1)
CALCIUM SERPL-MCNC: 8.3 MG/DL — LOW (ref 8.5–10.1)
CHLORIDE SERPL-SCNC: 114 MMOL/L — HIGH (ref 96–108)
CHLORIDE SERPL-SCNC: 114 MMOL/L — HIGH (ref 96–108)
CO2 SERPL-SCNC: 21 MMOL/L — LOW (ref 22–31)
CO2 SERPL-SCNC: 21 MMOL/L — LOW (ref 22–31)
CREAT SERPL-MCNC: 5.88 MG/DL — HIGH (ref 0.5–1.3)
CREAT SERPL-MCNC: 5.88 MG/DL — HIGH (ref 0.5–1.3)
EGFR: 8 ML/MIN/1.73M2 — LOW
EGFR: 8 ML/MIN/1.73M2 — LOW
EOSINOPHIL # BLD AUTO: 0.1 K/UL — SIGNIFICANT CHANGE UP (ref 0–0.5)
EOSINOPHIL # BLD AUTO: 0.1 K/UL — SIGNIFICANT CHANGE UP (ref 0–0.5)
EOSINOPHIL NFR BLD AUTO: 1.4 % — SIGNIFICANT CHANGE UP (ref 0–6)
EOSINOPHIL NFR BLD AUTO: 1.4 % — SIGNIFICANT CHANGE UP (ref 0–6)
GLUCOSE SERPL-MCNC: 107 MG/DL — HIGH (ref 70–99)
GLUCOSE SERPL-MCNC: 107 MG/DL — HIGH (ref 70–99)
HCT VFR BLD CALC: 34.9 % — SIGNIFICANT CHANGE UP (ref 34.5–45)
HCT VFR BLD CALC: 34.9 % — SIGNIFICANT CHANGE UP (ref 34.5–45)
HGB BLD-MCNC: 11.5 G/DL — SIGNIFICANT CHANGE UP (ref 11.5–15.5)
HGB BLD-MCNC: 11.5 G/DL — SIGNIFICANT CHANGE UP (ref 11.5–15.5)
IMM GRANULOCYTES NFR BLD AUTO: 0.3 % — SIGNIFICANT CHANGE UP (ref 0–0.9)
IMM GRANULOCYTES NFR BLD AUTO: 0.3 % — SIGNIFICANT CHANGE UP (ref 0–0.9)
LYMPHOCYTES # BLD AUTO: 1.27 K/UL — SIGNIFICANT CHANGE UP (ref 1–3.3)
LYMPHOCYTES # BLD AUTO: 1.27 K/UL — SIGNIFICANT CHANGE UP (ref 1–3.3)
LYMPHOCYTES # BLD AUTO: 17.7 % — SIGNIFICANT CHANGE UP (ref 13–44)
LYMPHOCYTES # BLD AUTO: 17.7 % — SIGNIFICANT CHANGE UP (ref 13–44)
MCHC RBC-ENTMCNC: 30.7 PG — SIGNIFICANT CHANGE UP (ref 27–34)
MCHC RBC-ENTMCNC: 30.7 PG — SIGNIFICANT CHANGE UP (ref 27–34)
MCHC RBC-ENTMCNC: 33 GM/DL — SIGNIFICANT CHANGE UP (ref 32–36)
MCHC RBC-ENTMCNC: 33 GM/DL — SIGNIFICANT CHANGE UP (ref 32–36)
MCV RBC AUTO: 93.1 FL — SIGNIFICANT CHANGE UP (ref 80–100)
MCV RBC AUTO: 93.1 FL — SIGNIFICANT CHANGE UP (ref 80–100)
MONOCYTES # BLD AUTO: 1.06 K/UL — HIGH (ref 0–0.9)
MONOCYTES # BLD AUTO: 1.06 K/UL — HIGH (ref 0–0.9)
MONOCYTES NFR BLD AUTO: 14.8 % — HIGH (ref 2–14)
MONOCYTES NFR BLD AUTO: 14.8 % — HIGH (ref 2–14)
NEUTROPHILS # BLD AUTO: 4.7 K/UL — SIGNIFICANT CHANGE UP (ref 1.8–7.4)
NEUTROPHILS # BLD AUTO: 4.7 K/UL — SIGNIFICANT CHANGE UP (ref 1.8–7.4)
NEUTROPHILS NFR BLD AUTO: 65.4 % — SIGNIFICANT CHANGE UP (ref 43–77)
NEUTROPHILS NFR BLD AUTO: 65.4 % — SIGNIFICANT CHANGE UP (ref 43–77)
PLATELET # BLD AUTO: 153 K/UL — SIGNIFICANT CHANGE UP (ref 150–400)
PLATELET # BLD AUTO: 153 K/UL — SIGNIFICANT CHANGE UP (ref 150–400)
POTASSIUM SERPL-MCNC: 4.6 MMOL/L — SIGNIFICANT CHANGE UP (ref 3.5–5.3)
POTASSIUM SERPL-MCNC: 4.6 MMOL/L — SIGNIFICANT CHANGE UP (ref 3.5–5.3)
POTASSIUM SERPL-SCNC: 4.6 MMOL/L — SIGNIFICANT CHANGE UP (ref 3.5–5.3)
POTASSIUM SERPL-SCNC: 4.6 MMOL/L — SIGNIFICANT CHANGE UP (ref 3.5–5.3)
RBC # BLD: 3.75 M/UL — LOW (ref 3.8–5.2)
RBC # BLD: 3.75 M/UL — LOW (ref 3.8–5.2)
RBC # FLD: 12.8 % — SIGNIFICANT CHANGE UP (ref 10.3–14.5)
RBC # FLD: 12.8 % — SIGNIFICANT CHANGE UP (ref 10.3–14.5)
SODIUM SERPL-SCNC: 141 MMOL/L — SIGNIFICANT CHANGE UP (ref 135–145)
SODIUM SERPL-SCNC: 141 MMOL/L — SIGNIFICANT CHANGE UP (ref 135–145)
WBC # BLD: 7.18 K/UL — SIGNIFICANT CHANGE UP (ref 3.8–10.5)
WBC # BLD: 7.18 K/UL — SIGNIFICANT CHANGE UP (ref 3.8–10.5)
WBC # FLD AUTO: 7.18 K/UL — SIGNIFICANT CHANGE UP (ref 3.8–10.5)
WBC # FLD AUTO: 7.18 K/UL — SIGNIFICANT CHANGE UP (ref 3.8–10.5)

## 2023-10-18 PROCEDURE — 78226 HEPATOBILIARY SYSTEM IMAGING: CPT | Mod: 26

## 2023-10-18 PROCEDURE — 93010 ELECTROCARDIOGRAM REPORT: CPT

## 2023-10-18 PROCEDURE — 76770 US EXAM ABDO BACK WALL COMP: CPT | Mod: 26

## 2023-10-18 PROCEDURE — 93306 TTE W/DOPPLER COMPLETE: CPT | Mod: 26

## 2023-10-18 PROCEDURE — 99233 SBSQ HOSP IP/OBS HIGH 50: CPT

## 2023-10-18 RX ADMIN — Medication 0.2 MILLIGRAM(S): at 21:49

## 2023-10-18 RX ADMIN — HYDROMORPHONE HYDROCHLORIDE 0.5 MILLIGRAM(S): 2 INJECTION INTRAMUSCULAR; INTRAVENOUS; SUBCUTANEOUS at 18:19

## 2023-10-18 RX ADMIN — HYDROMORPHONE HYDROCHLORIDE 0.5 MILLIGRAM(S): 2 INJECTION INTRAMUSCULAR; INTRAVENOUS; SUBCUTANEOUS at 21:42

## 2023-10-18 RX ADMIN — ONDANSETRON 4 MILLIGRAM(S): 8 TABLET, FILM COATED ORAL at 21:48

## 2023-10-18 RX ADMIN — SENNA PLUS 2 TABLET(S): 8.6 TABLET ORAL at 21:51

## 2023-10-18 RX ADMIN — HYDROMORPHONE HYDROCHLORIDE 0.5 MILLIGRAM(S): 2 INJECTION INTRAMUSCULAR; INTRAVENOUS; SUBCUTANEOUS at 17:19

## 2023-10-18 RX ADMIN — Medication 650 MILLIGRAM(S): at 04:05

## 2023-10-18 NOTE — CONSULT NOTE ADULT - SUBJECTIVE AND OBJECTIVE BOX
52 yo fem h/o UC on remisison, taking Entyvio x last 2 years, no active disease on last Colonoscopy refers not feeling well x last 3-4 days, right sided abdominal pain, no vomiting, denies fatty meal intake. RUQ showed thickened gallbladder wall, no gallstones. Normal WBC, Normal lFts. Creat 6.    "date of service"10-17-23 @ 16:20    HPI:      PAST MEDICAL & SURGICAL HISTORY:  Ulcerative colitis      Ulcerative colitis      H/O  section          10-17-23 @ 16:20  REVIEW OF SYSTEMS:    CONSTITUTIONAL: No weakness, fevers or chills  EYES/ENT: No visual changes;  No vertigo or throat pain   NECK: No pain or stiffness  RESPIRATORY: No cough, wheezing, hemoptysis; No shortness of breath  CARDIOVASCULAR: No chest pain or palpitations  GASTROINTESTINAL: abdominal  pain. No nausea, vomiting, or hematemesis; No diarrhea or constipation. No melena or hematochezia.  GENITOURINARY: No dysuria, frequency or hematuria  NEUROLOGICAL: No numbness or weakness  SKIN: No itching, burning, rashes, or lesions   All other review of systems is negative unless indicated above.    MEDICATIONS  (STANDING):    MEDICATIONS  (PRN):      Allergies    No Known Allergies    Intolerances        SOCIAL HISTORY:  non smoker    FAMILY HISTORY:  No pertinent family history in first degree relatives        Vital Signs Last 24 Hrs  T(C): 36.8 (17 Oct 2023 15:22), Max: 36.8 (17 Oct 2023 15:22)  T(F): 98.2 (17 Oct 2023 15:22), Max: 98.2 (17 Oct 2023 15:22)  HR: 63 (17 Oct 2023 15:22) (58 - 63)  BP: 137/85 (17 Oct 2023 15:22) (137/85 - 146/86)  BP(mean): --  RR: 19 (17 Oct 2023 11:10) (19 - 19)  SpO2: 98% (17 Oct 2023 15:22) (98% - 100%)    Parameters below as of 17 Oct 2023 15:22  Patient On (Oxygen Delivery Method): room air        .10-17-23 @ 16:20  VITAL SIGNS:  T(C): 36.8 (10-17-23 @ 15:22), Max: 36.8 (10-17-23 @ 15:22)  T(F): 98.2 (10-17-23 @ 15:22), Max: 98.2 (10-17-23 @ 15:22)  HR: 63 (10-17-23 @ 15:22) (58 - 63)  BP: 137/85 (10-17-23 @ 15:22) (137/85 - 146/86)  BP(mean): --  RR: 19 (10-17-23 @ 11:10) (19 - 19)  SpO2: 98% (10-17-23 @ 15:22) (98% - 100%)  Wt(kg): --    PHYSICAL EXAM:    Constitutional: resting comfortably in bed; NAD  Eyes: PERRL, EOMI, anicteric sclera  ENT: no nasal discharge; uvula midline, no oropharyngeal erythema or exudates  Neck: supple; no JVD or thyromegaly  Respiratory: CTA B/L; no W/R/R, no retractions  Cardiac: +S1/S2; RRR; no murmurs  Gastrointestinal: soft, Rt sided ab dtenderness, diffuse muscle tenderness  Back: spine midline, no bony tenderness or step-offs; no CVAT B/L  Extremities: no clubbing or cyanosis; no peripheral edema  Musculoskeletal: NROM x4; no joint swelling, diffuse tenderness   Vascular: 2+ radial, femoral, DP/PT pulses B/L  Dermatologic: skin warm, dry and intact; no rashes, wounds, or scars  Lymphatic: no submandibular or cervical LAD  Neurologic: AAOx3; CNII-XII grossly intact; no focal deficits  Psychiatric: affect and characteristics of appearance, verbalizations, behaviors are appropriate    LABS:                        13.2   9.23  )-----------( 178      ( 17 Oct 2023 12:49 )             38.0       10    134<L>  |  106  |  63<H>  ----------------------------<  112<H>  5.3   |  22  |  6.64<H>    Ca    9.1      17 Oct 2023 12:49    TPro  7.3  /  Alb  3.6  /  TBili  0.4  /  DBili  x   /  AST  32  /  ALT  62  /  AlkPhos  102  10-      PT/INR - ( 17 Oct 2023 12:49 )   PT: 10.5 sec;   INR: 0.93 ratio         PTT - ( 17 Oct 2023 12:49 )  PTT:30.8 sec    Urinalysis Basic - ( 17 Oct 2023 12:49 )    Color: Yellow / Appearance: Clear / SG: <1.005 / pH: x  Gluc: 112 mg/dL / Ketone: Negative mg/dL  / Bili: Negative / Urobili: 0.2 mg/dL   Blood: x / Protein: Negative mg/dL / Nitrite: Negative   Leuk Esterase: Small / RBC: 0 /HPF / WBC 6 /HPF   Sq Epi: x / Non Sq Epi: 2 /HPF / Bacteria: Negative /HPF        RADIOLOGY & ADDITIONAL STUDIES:  < from: CT Abdomen and Pelvis No Cont (10.17.23 @ 15:58) >    IMPRESSION:  Trace gallbladder mural thickening/pericholecystic fluid.  Small right pleural effusion and small volume pelvic fluid.  Cannot exclude mural thickening involving the descending colon.    < end of copied text >  < from: US Abdomen Upper Quadrant Right (10.17.23 @ 12:18) >    IMPRESSION:  Thickened edematous gallbladder wall with a small amount of   pericholecystic fluid; appearance of the gallbladder can be secondary to   underlying gallbladder or liver disease.    No gallstones or tenderness over the gallbladder at the time the study.    < end of copied text >  
53 ulcerative colitis presents with abdominal pain. Patient states that Friday night she woke up and had a headache and abdominal discomfort.  At that time she noticed that she had some epigastric pain and body aches.  She reports poor intake, poor hydration and was taking advil TTs daily since this started on Friday.  She had continued body aches, felt dizzy/lightheaded as if she was on a boat.  She is receiving Entyvio infusions every 8 weeks for her ulcerative colitis.  Presented to ED and noted with DANNY and was give IVF and underwent imaging and now a HIDA.     PAST MEDICAL & SURGICAL HISTORY:  Ulcerative colitis      Ulcerative colitis      H/O  section          MEDICATIONS  (STANDING):  citalopram 20 milliGRAM(s) Oral daily  cloNIDine 0.2 milliGRAM(s) Oral at bedtime  influenza   Vaccine 0.5 milliLiter(s) IntraMuscular once  lidocaine   4% Patch 1 Patch Transdermal daily  multivitamin 1 Tablet(s) Oral daily  naloxone Injectable 0.4 milliGRAM(s) IV Push once  polyethylene glycol 3350 17 Gram(s) Oral daily  senna 2 Tablet(s) Oral at bedtime  sodium chloride 0.9%. 1000 milliLiter(s) (100 mL/Hr) IV Continuous <Continuous>    MEDICATIONS  (PRN):  acetaminophen     Tablet .. 650 milliGRAM(s) Oral every 6 hours PRN Moderate Pain (4 - 6)  aluminum hydroxide/magnesium hydroxide/simethicone Suspension 30 milliLiter(s) Oral every 4 hours PRN Dyspepsia  bisacodyl 5 milliGRAM(s) Oral daily PRN Constipation  HYDROmorphone  Injectable 0.2 milliGRAM(s) IV Push every 4 hours PRN Moderate Pain (4 - 6)  HYDROmorphone  Injectable 0.5 milliGRAM(s) IV Push every 4 hours PRN Severe Pain (7 - 10)  melatonin 3 milliGRAM(s) Oral at bedtime PRN Insomnia  ondansetron Injectable 4 milliGRAM(s) IV Push every 6 hours PRN Nausea and/or Vomiting  valACYclovir 1000 milliGRAM(s) Oral daily PRN cold sore      Allergies    No Known Allergies    Intolerances        SOCIAL HISTORY:  no etoh/cigg      FAMILY HISTORY:  No pertinent family history in first degree relatives        REVIEW OF SYSTEMS:    CONSTITUTIONAL: stable weakness, fevers or chills  EYES/ENT: No visual changes;  No vertigo or throat pain   NECK: No pain or stiffness  RESPIRATORY: No cough, wheezing, hemoptysis; No shortness of breath  CARDIOVASCULAR: No chest pain or palpitations  GASTROINTESTINAL: No abdominal or epigastric pain. No nausea, vomiting, or hematemesis; No diarrhea or constipation. No melena or hematochezia.  GENITOURINARY: No dysuria, frequency or hematuria  NEUROLOGICAL: No numbness or weakness  SKIN: No itching, burning, rashes, or lesions   All other review of systems is negative unless indicated above.      T(C): , Max: 36.8 (10-17-23 @ 15:22)  T(F): , Max: 98.2 (10-17-23 @ 15:22)  HR: 67 (10-18-23 @ 07:56)  BP: 135/85 (10-18-23 @ 07:56)  BP(mean): 89 (10-18-23 @ 07:56)  RR: 18 (10-18-23 @ 07:56)  SpO2: 99% (10-18-23 @ 07:56)  Wt(kg): --        PHYSICAL EXAM:    Constitutional: NAD   HEENT:  MM  Neck: No LAD, No JVD  Respiratory: dist  Cardiovascular: S1 and S2,   Extremities: No peripheral edema  Neurological: A/O x 3, no focal deficits  Psychiatric: Normal mood, normal affect  : No Valdes           LABS:                        11.5   7.18  )-----------( 153      ( 18 Oct 2023 06:51 )             34.9     18 Oct 2023 06:51    141    |  114    |  54     ----------------------------<  107    4.6     |  21     |  5.88   17 Oct 2023 12:49    134    |  106    |  63     ----------------------------<  112    5.3     |  22     |  6.64     Ca    8.3        18 Oct 2023 06:51  Ca    9.1        17 Oct 2023 12:49  Phos  4.5       17 Oct 2023 22:12  Mg     2.3       17 Oct 2023 22:12    TPro  7.3    /  Alb  3.6    /  TBili  0.4    /  DBili  x      /  AST  32     /  ALT  62     /  AlkPhos  102    17 Oct 2023 12:49      Creatine Kinase, Serum: 71 U/L [26 - 192] (10-17 @ 22:12)  Creatine Kinase, Serum: 97 U/L [26 - 192] (10-17 @ 12:49)      Urine Studies:  Urinalysis Basic - ( 18 Oct 2023 06:51 )    Color: x / Appearance: x / SG: x / pH: x  Gluc: 107 mg/dL / Ketone: x  / Bili: x / Urobili: x   Blood: x / Protein: x / Nitrite: x   Leuk Esterase: x / RBC: x / WBC x   Sq Epi: x / Non Sq Epi: x / Bacteria: x            RADIOLOGY & ADDITIONAL STUDIES:

## 2023-10-18 NOTE — CONSULT NOTE ADULT - ASSESSMENT
53 ulcerative colitis presents with abdominal pain. Patient states that Friday night she woke up and had a headache and abdominal discomfort.  At that time she noticed that she had some epigastric pain and body aches.  She reports poor intake, poor hydration and was taking advil TTs daily since this started on Friday.  She had continued body aches, felt dizzy/lightheaded as if she was on a boat.  She is receiving Entyvio infusions every 8 weeks for her ulcerative colitis.     DANNY  -Based on bland urine, response to ivf likely pre renal with NSAIDs contributing as well  -IVF to maintain  -No further nsaids  -Optimize intake  -Trend labs  -can consider further serology if rise in function    Abdominal pain  -FU hida results  -Surgical/medical    thanks, will follow  d/c with RN staff

## 2023-10-18 NOTE — PROGRESS NOTE ADULT - SUBJECTIVE AND OBJECTIVE BOX
HOSPITALIST ATTENDING PROGRESS NOTE    Chart and meds reviewed.      Subjective: Patient seen and examined. Resting comfortably Cr mildly improved to 5.88 today. HIDA scan negative. No surgical intervention indication. Seen by Nephrology, recommendations appreciated. Continue IV fluids.  Patient did report that her daughter has had some kidney issues. and that she drinks a lot of water and may have noticed less urine output than normal. Continue to monitor Is and Os. She also noticed recent increase in weight gain and being more bloated, however this has improed from yesterday.       Additional results/Imaging, I have personally reviewed:    LABS:                            11.5   7.18  )-----------( 153      ( 18 Oct 2023 06:51 )             34.9     10-18    141  |  114<H>  |  54<H>  ----------------------------<  107<H>  4.6   |  21<L>  |  5.88<H>    Ca    8.3<L>      18 Oct 2023 06:51  Phos  4.5     10-17  Mg     2.3     10-17    TPro  7.3  /  Alb  3.6  /  TBili  0.4  /  DBili  x   /  AST  32  /  ALT  62  /  AlkPhos  102  10-17    CARDIAC MARKERS ( 17 Oct 2023 22:12 )  x     / x     / 71 U/L / x     / x      CARDIAC MARKERS ( 17 Oct 2023 12:49 )  x     / x     / 97 U/L / x     / x          LIVER FUNCTIONS - ( 17 Oct 2023 12:49 )  Alb: 3.6 g/dL / Pro: 7.3 gm/dL / ALK PHOS: 102 U/L / ALT: 62 U/L / AST: 32 U/L / GGT: x           PT/INR - ( 17 Oct 2023 12:49 )   PT: 10.5 sec;   INR: 0.93 ratio         PTT - ( 17 Oct 2023 12:49 )  PTT:30.8 sec  Urinalysis Basic - ( 18 Oct 2023 06:51 )    Color: x / Appearance: x / SG: x / pH: x  Gluc: 107 mg/dL / Ketone: x  / Bili: x / Urobili: x   Blood: x / Protein: x / Nitrite: x   Leuk Esterase: x / RBC: x / WBC x   Sq Epi: x / Non Sq Epi: x / Bacteria: x              All other systems reviewed and found to be negative with the exception of what has been described above.    MEDICATIONS  (STANDING):  citalopram 20 milliGRAM(s) Oral daily  cloNIDine 0.2 milliGRAM(s) Oral at bedtime  influenza   Vaccine 0.5 milliLiter(s) IntraMuscular once  lidocaine   4% Patch 1 Patch Transdermal daily  multivitamin 1 Tablet(s) Oral daily  naloxone Injectable 0.4 milliGRAM(s) IV Push once  polyethylene glycol 3350 17 Gram(s) Oral daily  senna 2 Tablet(s) Oral at bedtime  sodium chloride 0.9%. 1000 milliLiter(s) (100 mL/Hr) IV Continuous <Continuous>    MEDICATIONS  (PRN):  acetaminophen     Tablet .. 650 milliGRAM(s) Oral every 6 hours PRN Moderate Pain (4 - 6)  aluminum hydroxide/magnesium hydroxide/simethicone Suspension 30 milliLiter(s) Oral every 4 hours PRN Dyspepsia  bisacodyl 5 milliGRAM(s) Oral daily PRN Constipation  HYDROmorphone  Injectable 0.2 milliGRAM(s) IV Push every 4 hours PRN Moderate Pain (4 - 6)  HYDROmorphone  Injectable 0.5 milliGRAM(s) IV Push every 4 hours PRN Severe Pain (7 - 10)  melatonin 3 milliGRAM(s) Oral at bedtime PRN Insomnia  ondansetron Injectable 4 milliGRAM(s) IV Push every 6 hours PRN Nausea and/or Vomiting  valACYclovir 1000 milliGRAM(s) Oral daily PRN cold sore      VITALS:  T(F): 97.9 (10-18-23 @ 07:56), Max: 98.2 (10-17-23 @ 15:22)  HR: 67 (10-18-23 @ 07:56) (62 - 67)  BP: 135/85 (10-18-23 @ 07:56) (126/77 - 153/108)  RR: 18 (10-18-23 @ 07:56) (16 - 18)  SpO2: 99% (10-18-23 @ 07:56) (98% - 100%)  Wt(kg): --    I&O's Summary      CAPILLARY BLOOD GLUCOSE          PHYSICAL EXAM:  General: Awake and alert, cooperative with exam. No acute distress.   Skin: Warm, dry, and pink.   Eyes: Pupils equal and reactive to light. Extraocular eye movements intact. No conjunctival injection, discharge, or scleral icterus.   HEENT: Atraumatic, normocephalic. Moist mucus membranes.   Cardiology: Normal S1, S2. No murmurs, rubs, or gallops. Regular rate and rhythm.   Respiratory: Lungs clear to ascultation bilaterally. Good air exchange. No wheezes, rales, or rhonchi. Normal chest expansion.   Gastrointestinal: Positive bowel sounds. Soft. No guarding, rigidity, or rebound tenderness. No hepatosplenomegaly. + abd distension, + epigastric and RUQ pain. + Santana's sign.   Musculoskeletal: 5/5 motor strength in all extremities. Normal range of motion.   Extremities: No peripheral edema bilaterally. Dorsalis pedis pulses 2+ bilaterally.   Neurological: A+Ox3 (person, place, and time). Cranial nerves 2-12 intact. Normal speech. No facial droop. No focal neurological deficits.   Psychiatric: Normal affect. Normal mood.      CULTURES:      Telemetry, personally reviewed HOSPITALIST ATTENDING PROGRESS NOTE    Chart and meds reviewed.      Subjective: Patient seen and examined. Resting comfortably Cr mildly improved to 5.88 today. HIDA scan negative. No surgical intervention indication. Seen by Nephrology, recommendations appreciated. Continue IV fluids.  Patient did report that her daughter has had some kidney issues. and that she drinks a lot of water and may have noticed less urine output than normal. Continue to monitor Is and Os. She also noticed recent increase in weight gain and being more bloated, however this has improved from yesterday.       Additional results/Imaging, I have personally reviewed:    LABS:                            11.5   7.18  )-----------( 153      ( 18 Oct 2023 06:51 )             34.9     10-18    141  |  114<H>  |  54<H>  ----------------------------<  107<H>  4.6   |  21<L>  |  5.88<H>    Ca    8.3<L>      18 Oct 2023 06:51  Phos  4.5     10-17  Mg     2.3     10-17    TPro  7.3  /  Alb  3.6  /  TBili  0.4  /  DBili  x   /  AST  32  /  ALT  62  /  AlkPhos  102  10-17    CARDIAC MARKERS ( 17 Oct 2023 22:12 )  x     / x     / 71 U/L / x     / x      CARDIAC MARKERS ( 17 Oct 2023 12:49 )  x     / x     / 97 U/L / x     / x          LIVER FUNCTIONS - ( 17 Oct 2023 12:49 )  Alb: 3.6 g/dL / Pro: 7.3 gm/dL / ALK PHOS: 102 U/L / ALT: 62 U/L / AST: 32 U/L / GGT: x           PT/INR - ( 17 Oct 2023 12:49 )   PT: 10.5 sec;   INR: 0.93 ratio         PTT - ( 17 Oct 2023 12:49 )  PTT:30.8 sec  Urinalysis Basic - ( 18 Oct 2023 06:51 )    Color: x / Appearance: x / SG: x / pH: x  Gluc: 107 mg/dL / Ketone: x  / Bili: x / Urobili: x   Blood: x / Protein: x / Nitrite: x   Leuk Esterase: x / RBC: x / WBC x   Sq Epi: x / Non Sq Epi: x / Bacteria: x              All other systems reviewed and found to be negative with the exception of what has been described above.    MEDICATIONS  (STANDING):  citalopram 20 milliGRAM(s) Oral daily  cloNIDine 0.2 milliGRAM(s) Oral at bedtime  influenza   Vaccine 0.5 milliLiter(s) IntraMuscular once  lidocaine   4% Patch 1 Patch Transdermal daily  multivitamin 1 Tablet(s) Oral daily  naloxone Injectable 0.4 milliGRAM(s) IV Push once  polyethylene glycol 3350 17 Gram(s) Oral daily  senna 2 Tablet(s) Oral at bedtime  sodium chloride 0.9%. 1000 milliLiter(s) (100 mL/Hr) IV Continuous <Continuous>    MEDICATIONS  (PRN):  acetaminophen     Tablet .. 650 milliGRAM(s) Oral every 6 hours PRN Moderate Pain (4 - 6)  aluminum hydroxide/magnesium hydroxide/simethicone Suspension 30 milliLiter(s) Oral every 4 hours PRN Dyspepsia  bisacodyl 5 milliGRAM(s) Oral daily PRN Constipation  HYDROmorphone  Injectable 0.2 milliGRAM(s) IV Push every 4 hours PRN Moderate Pain (4 - 6)  HYDROmorphone  Injectable 0.5 milliGRAM(s) IV Push every 4 hours PRN Severe Pain (7 - 10)  melatonin 3 milliGRAM(s) Oral at bedtime PRN Insomnia  ondansetron Injectable 4 milliGRAM(s) IV Push every 6 hours PRN Nausea and/or Vomiting  valACYclovir 1000 milliGRAM(s) Oral daily PRN cold sore      VITALS:  T(F): 97.9 (10-18-23 @ 07:56), Max: 98.2 (10-17-23 @ 15:22)  HR: 67 (10-18-23 @ 07:56) (62 - 67)  BP: 135/85 (10-18-23 @ 07:56) (126/77 - 153/108)  RR: 18 (10-18-23 @ 07:56) (16 - 18)  SpO2: 99% (10-18-23 @ 07:56) (98% - 100%)  Wt(kg): --    I&O's Summary      CAPILLARY BLOOD GLUCOSE          PHYSICAL EXAM:  General: Awake and alert, cooperative with exam. No acute distress.   Skin: Warm, dry, and pink.   Eyes: Pupils equal and reactive to light. Extraocular eye movements intact. No conjunctival injection, discharge, or scleral icterus.   HEENT: Atraumatic, normocephalic. Moist mucus membranes.   Cardiology: Normal S1, S2. No murmurs, rubs, or gallops. Regular rate and rhythm.   Respiratory: Lungs clear to ascultation bilaterally. Good air exchange. No wheezes, rales, or rhonchi. Normal chest expansion.   Gastrointestinal: Positive bowel sounds. Soft. No guarding, rigidity, or rebound tenderness. No hepatosplenomegaly.  mild abdominal distention, improving  Musculoskeletal: 5/5 motor strength in all extremities. Normal range of motion. + left flank pain  Extremities: No peripheral edema bilaterally. Dorsalis pedis pulses 2+ bilaterally.   Neurological: A+Ox3 (person, place, and time) Normal speech. No facial droop. No focal neurological deficits.   Psychiatric: Normal affect. Normal mood.    CULTURES:      Telemetry, personally reviewed

## 2023-10-18 NOTE — PROGRESS NOTE ADULT - SUBJECTIVE AND OBJECTIVE BOX
44 yo fem with UC, admitted with DANNY, creat 6, HIDA is negat    Abd soft, less tender          10-18-23 @ 11:32    Vital Signs Last 24 Hrs  T(C): 36.6 (18 Oct 2023 07:56), Max: 36.8 (17 Oct 2023 15:22)  T(F): 97.9 (18 Oct 2023 07:56), Max: 98.2 (17 Oct 2023 15:22)  HR: 67 (18 Oct 2023 07:56) (62 - 67)  BP: 135/85 (18 Oct 2023 07:56) (126/77 - 153/108)  BP(mean): 89 (18 Oct 2023 07:56) (89 - 117)  RR: 18 (18 Oct 2023 07:56) (16 - 18)  SpO2: 99% (18 Oct 2023 07:56) (98% - 100%)    Parameters below as of 18 Oct 2023 07:56  Patient On (Oxygen Delivery Method): room air                              11.5   7.18  )-----------( 153      ( 18 Oct 2023 06:51 )             34.9       10-18    141  |  114<H>  |  54<H>  ----------------------------<  107<H>  4.6   |  21<L>  |  5.88<H>    Ca    8.3<L>      18 Oct 2023 06:51  Phos  4.5     10-17  Mg     2.3     10-17    TPro  7.3  /  Alb  3.6  /  TBili  0.4  /  DBili  x   /  AST  32  /  ALT  62  /  AlkPhos  102  10-17      LIVER FUNCTIONS - ( 17 Oct 2023 12:49 )  Alb: 3.6 g/dL / Pro: 7.3 gm/dL / ALK PHOS: 102 U/L / ALT: 62 U/L / AST: 32 U/L / GGT: x             MEDICATIONS  (STANDING):  citalopram 20 milliGRAM(s) Oral daily  cloNIDine 0.2 milliGRAM(s) Oral at bedtime  influenza   Vaccine 0.5 milliLiter(s) IntraMuscular once  lidocaine   4% Patch 1 Patch Transdermal daily  multivitamin 1 Tablet(s) Oral daily  naloxone Injectable 0.4 milliGRAM(s) IV Push once  polyethylene glycol 3350 17 Gram(s) Oral daily  senna 2 Tablet(s) Oral at bedtime  sodium chloride 0.9%. 1000 milliLiter(s) (100 mL/Hr) IV Continuous <Continuous>    MEDICATIONS  (PRN):  acetaminophen     Tablet .. 650 milliGRAM(s) Oral every 6 hours PRN Moderate Pain (4 - 6)  aluminum hydroxide/magnesium hydroxide/simethicone Suspension 30 milliLiter(s) Oral every 4 hours PRN Dyspepsia  bisacodyl 5 milliGRAM(s) Oral daily PRN Constipation  HYDROmorphone  Injectable 0.5 milliGRAM(s) IV Push every 4 hours PRN Severe Pain (7 - 10)  HYDROmorphone  Injectable 0.2 milliGRAM(s) IV Push every 4 hours PRN Moderate Pain (4 - 6)  melatonin 3 milliGRAM(s) Oral at bedtime PRN Insomnia  ondansetron Injectable 4 milliGRAM(s) IV Push every 6 hours PRN Nausea and/or Vomiting  valACYclovir 1000 milliGRAM(s) Oral daily PRN cold sore      MEDICATIONS  (STANDING):  citalopram 20 milliGRAM(s) Oral daily  cloNIDine 0.2 milliGRAM(s) Oral at bedtime  influenza   Vaccine 0.5 milliLiter(s) IntraMuscular once  lidocaine   4% Patch 1 Patch Transdermal daily  multivitamin 1 Tablet(s) Oral daily  naloxone Injectable 0.4 milliGRAM(s) IV Push once  polyethylene glycol 3350 17 Gram(s) Oral daily  senna 2 Tablet(s) Oral at bedtime  sodium chloride 0.9%. 1000 milliLiter(s) (100 mL/Hr) IV Continuous <Continuous>

## 2023-10-19 LAB
ADD ON TEST-SPECIMEN IN LAB: SIGNIFICANT CHANGE UP
ADD ON TEST-SPECIMEN IN LAB: SIGNIFICANT CHANGE UP
ANION GAP SERPL CALC-SCNC: 4 MMOL/L — LOW (ref 5–17)
ANION GAP SERPL CALC-SCNC: 4 MMOL/L — LOW (ref 5–17)
APPEARANCE UR: CLEAR — SIGNIFICANT CHANGE UP
APPEARANCE UR: CLEAR — SIGNIFICANT CHANGE UP
BACTERIA # UR AUTO: NEGATIVE /HPF — SIGNIFICANT CHANGE UP
BACTERIA # UR AUTO: NEGATIVE /HPF — SIGNIFICANT CHANGE UP
BILIRUB UR-MCNC: NEGATIVE — SIGNIFICANT CHANGE UP
BILIRUB UR-MCNC: NEGATIVE — SIGNIFICANT CHANGE UP
BUN SERPL-MCNC: 44 MG/DL — HIGH (ref 7–23)
BUN SERPL-MCNC: 44 MG/DL — HIGH (ref 7–23)
CALCIUM SERPL-MCNC: 9 MG/DL — SIGNIFICANT CHANGE UP (ref 8.5–10.1)
CALCIUM SERPL-MCNC: 9 MG/DL — SIGNIFICANT CHANGE UP (ref 8.5–10.1)
CAST: 0 /LPF — SIGNIFICANT CHANGE UP (ref 0–4)
CAST: 0 /LPF — SIGNIFICANT CHANGE UP (ref 0–4)
CHLORIDE SERPL-SCNC: 115 MMOL/L — HIGH (ref 96–108)
CHLORIDE SERPL-SCNC: 115 MMOL/L — HIGH (ref 96–108)
CO2 SERPL-SCNC: 24 MMOL/L — SIGNIFICANT CHANGE UP (ref 22–31)
CO2 SERPL-SCNC: 24 MMOL/L — SIGNIFICANT CHANGE UP (ref 22–31)
COLOR SPEC: YELLOW — SIGNIFICANT CHANGE UP
COLOR SPEC: YELLOW — SIGNIFICANT CHANGE UP
CREAT ?TM UR-MCNC: 64 MG/DL — SIGNIFICANT CHANGE UP
CREAT ?TM UR-MCNC: 64 MG/DL — SIGNIFICANT CHANGE UP
CREAT SERPL-MCNC: 4.76 MG/DL — HIGH (ref 0.5–1.3)
CREAT SERPL-MCNC: 4.76 MG/DL — HIGH (ref 0.5–1.3)
CULTURE RESULTS: SIGNIFICANT CHANGE UP
CULTURE RESULTS: SIGNIFICANT CHANGE UP
DIFF PNL FLD: NEGATIVE — SIGNIFICANT CHANGE UP
DIFF PNL FLD: NEGATIVE — SIGNIFICANT CHANGE UP
EGFR: 10 ML/MIN/1.73M2 — LOW
EGFR: 10 ML/MIN/1.73M2 — LOW
GLUCOSE SERPL-MCNC: 97 MG/DL — SIGNIFICANT CHANGE UP (ref 70–99)
GLUCOSE SERPL-MCNC: 97 MG/DL — SIGNIFICANT CHANGE UP (ref 70–99)
GLUCOSE UR QL: NEGATIVE MG/DL — SIGNIFICANT CHANGE UP
GLUCOSE UR QL: NEGATIVE MG/DL — SIGNIFICANT CHANGE UP
HCT VFR BLD CALC: 35.2 % — SIGNIFICANT CHANGE UP (ref 34.5–45)
HCT VFR BLD CALC: 35.2 % — SIGNIFICANT CHANGE UP (ref 34.5–45)
HGB BLD-MCNC: 11.9 G/DL — SIGNIFICANT CHANGE UP (ref 11.5–15.5)
HGB BLD-MCNC: 11.9 G/DL — SIGNIFICANT CHANGE UP (ref 11.5–15.5)
KETONES UR-MCNC: NEGATIVE MG/DL — SIGNIFICANT CHANGE UP
KETONES UR-MCNC: NEGATIVE MG/DL — SIGNIFICANT CHANGE UP
LEUKOCYTE ESTERASE UR-ACNC: ABNORMAL
LEUKOCYTE ESTERASE UR-ACNC: ABNORMAL
MCHC RBC-ENTMCNC: 31.1 PG — SIGNIFICANT CHANGE UP (ref 27–34)
MCHC RBC-ENTMCNC: 31.1 PG — SIGNIFICANT CHANGE UP (ref 27–34)
MCHC RBC-ENTMCNC: 33.8 GM/DL — SIGNIFICANT CHANGE UP (ref 32–36)
MCHC RBC-ENTMCNC: 33.8 GM/DL — SIGNIFICANT CHANGE UP (ref 32–36)
MCV RBC AUTO: 91.9 FL — SIGNIFICANT CHANGE UP (ref 80–100)
MCV RBC AUTO: 91.9 FL — SIGNIFICANT CHANGE UP (ref 80–100)
NITRITE UR-MCNC: NEGATIVE — SIGNIFICANT CHANGE UP
NITRITE UR-MCNC: NEGATIVE — SIGNIFICANT CHANGE UP
PH UR: 5 — SIGNIFICANT CHANGE UP (ref 5–8)
PH UR: 5 — SIGNIFICANT CHANGE UP (ref 5–8)
PLATELET # BLD AUTO: 183 K/UL — SIGNIFICANT CHANGE UP (ref 150–400)
PLATELET # BLD AUTO: 183 K/UL — SIGNIFICANT CHANGE UP (ref 150–400)
POTASSIUM SERPL-MCNC: 4.9 MMOL/L — SIGNIFICANT CHANGE UP (ref 3.5–5.3)
POTASSIUM SERPL-MCNC: 4.9 MMOL/L — SIGNIFICANT CHANGE UP (ref 3.5–5.3)
POTASSIUM SERPL-SCNC: 4.9 MMOL/L — SIGNIFICANT CHANGE UP (ref 3.5–5.3)
POTASSIUM SERPL-SCNC: 4.9 MMOL/L — SIGNIFICANT CHANGE UP (ref 3.5–5.3)
PROT ?TM UR-MCNC: 10 MG/DL — SIGNIFICANT CHANGE UP (ref 0–12)
PROT ?TM UR-MCNC: 10 MG/DL — SIGNIFICANT CHANGE UP (ref 0–12)
PROT UR-MCNC: NEGATIVE MG/DL — SIGNIFICANT CHANGE UP
PROT UR-MCNC: NEGATIVE MG/DL — SIGNIFICANT CHANGE UP
PROT/CREAT UR-RTO: 0.2 RATIO — SIGNIFICANT CHANGE UP (ref 0–0.2)
PROT/CREAT UR-RTO: 0.2 RATIO — SIGNIFICANT CHANGE UP (ref 0–0.2)
RBC # BLD: 3.83 M/UL — SIGNIFICANT CHANGE UP (ref 3.8–5.2)
RBC # BLD: 3.83 M/UL — SIGNIFICANT CHANGE UP (ref 3.8–5.2)
RBC # FLD: 12.7 % — SIGNIFICANT CHANGE UP (ref 10.3–14.5)
RBC # FLD: 12.7 % — SIGNIFICANT CHANGE UP (ref 10.3–14.5)
RBC CASTS # UR COMP ASSIST: 0 /HPF — SIGNIFICANT CHANGE UP (ref 0–4)
RBC CASTS # UR COMP ASSIST: 0 /HPF — SIGNIFICANT CHANGE UP (ref 0–4)
SODIUM SERPL-SCNC: 143 MMOL/L — SIGNIFICANT CHANGE UP (ref 135–145)
SODIUM SERPL-SCNC: 143 MMOL/L — SIGNIFICANT CHANGE UP (ref 135–145)
SP GR SPEC: 1.01 — SIGNIFICANT CHANGE UP (ref 1–1.03)
SP GR SPEC: 1.01 — SIGNIFICANT CHANGE UP (ref 1–1.03)
SPECIMEN SOURCE: SIGNIFICANT CHANGE UP
SPECIMEN SOURCE: SIGNIFICANT CHANGE UP
SQUAMOUS # UR AUTO: 4 /HPF — SIGNIFICANT CHANGE UP (ref 0–5)
SQUAMOUS # UR AUTO: 4 /HPF — SIGNIFICANT CHANGE UP (ref 0–5)
URATE SERPL-MCNC: 7.1 MG/DL — HIGH (ref 2.5–7)
URATE SERPL-MCNC: 7.1 MG/DL — HIGH (ref 2.5–7)
UROBILINOGEN FLD QL: 0.2 MG/DL — SIGNIFICANT CHANGE UP (ref 0.2–1)
UROBILINOGEN FLD QL: 0.2 MG/DL — SIGNIFICANT CHANGE UP (ref 0.2–1)
WBC # BLD: 8.3 K/UL — SIGNIFICANT CHANGE UP (ref 3.8–10.5)
WBC # BLD: 8.3 K/UL — SIGNIFICANT CHANGE UP (ref 3.8–10.5)
WBC # FLD AUTO: 8.3 K/UL — SIGNIFICANT CHANGE UP (ref 3.8–10.5)
WBC # FLD AUTO: 8.3 K/UL — SIGNIFICANT CHANGE UP (ref 3.8–10.5)
WBC UR QL: 8 /HPF — HIGH (ref 0–5)
WBC UR QL: 8 /HPF — HIGH (ref 0–5)

## 2023-10-19 PROCEDURE — 99232 SBSQ HOSP IP/OBS MODERATE 35: CPT

## 2023-10-19 RX ORDER — SODIUM CHLORIDE 9 MG/ML
1000 INJECTION INTRAMUSCULAR; INTRAVENOUS; SUBCUTANEOUS
Refills: 0 | Status: DISCONTINUED | OUTPATIENT
Start: 2023-10-19 | End: 2023-10-21

## 2023-10-19 RX ADMIN — HYDROMORPHONE HYDROCHLORIDE 0.5 MILLIGRAM(S): 2 INJECTION INTRAMUSCULAR; INTRAVENOUS; SUBCUTANEOUS at 10:45

## 2023-10-19 RX ADMIN — HYDROMORPHONE HYDROCHLORIDE 0.5 MILLIGRAM(S): 2 INJECTION INTRAMUSCULAR; INTRAVENOUS; SUBCUTANEOUS at 18:49

## 2023-10-19 RX ADMIN — Medication 1 TABLET(S): at 12:35

## 2023-10-19 RX ADMIN — POLYETHYLENE GLYCOL 3350 17 GRAM(S): 17 POWDER, FOR SOLUTION ORAL at 12:35

## 2023-10-19 RX ADMIN — CITALOPRAM 20 MILLIGRAM(S): 10 TABLET, FILM COATED ORAL at 12:36

## 2023-10-19 RX ADMIN — HYDROMORPHONE HYDROCHLORIDE 0.5 MILLIGRAM(S): 2 INJECTION INTRAMUSCULAR; INTRAVENOUS; SUBCUTANEOUS at 09:45

## 2023-10-19 RX ADMIN — HYDROMORPHONE HYDROCHLORIDE 0.5 MILLIGRAM(S): 2 INJECTION INTRAMUSCULAR; INTRAVENOUS; SUBCUTANEOUS at 14:45

## 2023-10-19 RX ADMIN — Medication 0.2 MILLIGRAM(S): at 21:43

## 2023-10-19 RX ADMIN — HYDROMORPHONE HYDROCHLORIDE 0.5 MILLIGRAM(S): 2 INJECTION INTRAMUSCULAR; INTRAVENOUS; SUBCUTANEOUS at 22:33

## 2023-10-19 RX ADMIN — SENNA PLUS 2 TABLET(S): 8.6 TABLET ORAL at 21:44

## 2023-10-19 RX ADMIN — HYDROMORPHONE HYDROCHLORIDE 0.5 MILLIGRAM(S): 2 INJECTION INTRAMUSCULAR; INTRAVENOUS; SUBCUTANEOUS at 17:49

## 2023-10-19 RX ADMIN — HYDROMORPHONE HYDROCHLORIDE 0.5 MILLIGRAM(S): 2 INJECTION INTRAMUSCULAR; INTRAVENOUS; SUBCUTANEOUS at 13:45

## 2023-10-19 NOTE — PROGRESS NOTE ADULT - SUBJECTIVE AND OBJECTIVE BOX
53 ulcerative colitis presents with abdominal pain. Patient states that Friday night she woke up and had a headache and abdominal discomfort.  At that time she noticed that she had some epigastric pain and body aches.  She reports poor intake, poor hydration and was taking advil TTs daily since this started on Friday.  She had continued body aches, felt dizzy/lightheaded as if she was on a boat.  She is receiving Entyvio infusions every 8 weeks for her ulcerative colitis.  Presented to ED and noted with DANNY and was give IVF and underwent imaging and now a HIDA.       today    pt denies major complaints   tolerating po      PAST MEDICAL & SURGICAL HISTORY:  Ulcerative colitis      Ulcerative colitis      H/O  section    Home Medications:  CeleXA 20 mg oral tablet: 1 tab(s) orally once a day (17 Oct 2023 16:06)  cloNIDine 0.1 mg oral tablet: 2 tab(s) orally once a day (at bedtime) as needed (17 Oct 2023 16:20)  dexmethylphenidate 20 mg oral capsule, extended release: 1 cap(s) orally once a day as needed (17 Oct 2023 16:44)  Entyvio 300 mg intravenous injection: 300 milligram(s) intravenously every 8 weeks (17 Oct 2023 16:44)  Hair, Skin, Nails 5 mg oral capsule: 1 cap(s) orally once a day (17 Oct 2023 16:44)  metroNIDAZOLE 0.75% topical gel: Apply topically to affected area once a day to face (17 Oct 2023 16:44)  Multiple Vitamins oral tablet: 1 tab(s) orally once a day (17 Oct 2023 16:44)  tretinoin 0.05% topical gel: Apply topically to affected area once a day to face (17 Oct 2023 16:44)  valACYclovir 1 g oral tablet: 1 tab(s) orally once a day as needed for cold sores (17 Oct 2023 16:44)      MEDICATIONS  (STANDING):  citalopram 20 milliGRAM(s) Oral daily  cloNIDine 0.2 milliGRAM(s) Oral at bedtime  influenza   Vaccine 0.5 milliLiter(s) IntraMuscular once  lidocaine   4% Patch 1 Patch Transdermal daily  multivitamin 1 Tablet(s) Oral daily  naloxone Injectable 0.4 milliGRAM(s) IV Push once  polyethylene glycol 3350 17 Gram(s) Oral daily  senna 2 Tablet(s) Oral at bedtime  sodium chloride 0.9%. 1000 milliLiter(s) (75 mL/Hr) IV Continuous <Continuous>  sodium chloride 0.9%. 1000 milliLiter(s) (100 mL/Hr) IV Continuous <Continuous>      Allergies    No Known Allergies    Intolerances        SOCIAL HISTORY:  no etoh/cigg      FAMILY HISTORY:  No pertinent family history in first degree relatives        REVIEW OF SYSTEMS:    CONSTITUTIONAL: stable weakness, fevers or chills  EYES/ENT: No visual changes;  No vertigo or throat pain   NECK: No pain or stiffness  RESPIRATORY: No cough, wheezing, hemoptysis; No shortness of breath  CARDIOVASCULAR: No chest pain or palpitations  GASTROINTESTINAL: No abdominal or epigastric pain. No nausea, vomiting, or hematemesis; No diarrhea or constipation. No melena or hematochezia.  GENITOURINARY: No dysuria, frequency or hematuria  NEUROLOGICAL: No numbness or weakness  SKIN: No itching, burning, rashes, or lesions   All other review of systems is negative unless indicated above.    Vital Signs Last 24 Hrs  T(C): 36.9 (19 Oct 2023 07:37), Max: 36.9 (19 Oct 2023 07:37)  T(F): 98.4 (19 Oct 2023 07:37), Max: 98.4 (19 Oct 2023 07:37)  HR: 61 (19 Oct 2023 07:37) (61 - 73)  BP: 134/79 (19 Oct 2023 07:37) (122/81 - 134/79)  BP(mean): 94 (18 Oct 2023 21:54) (94 - 94)  RR: 17 (19 Oct 2023 07:37) (17 - 18)  SpO2: 97% (19 Oct 2023 07:37) (97% - 100%)    Parameters below as of 19 Oct 2023 07:37  Patient On (Oxygen Delivery Method): room air    I&O's Detail      PHYSICAL EXAM:    Constitutional: NAD   HEENT:  MM  Neck: No LAD, No JVD  Respiratory: dist  Cardiovascular: S1 and S2,   Extremities: No peripheral edema  Neurological: A/O x 3, no focal deficits  : No Valdes           LABS:                                   11.9   8.30  )-----------( 183      ( 19 Oct 2023 06:49 )             35.2                         11.5   7.18  )-----------( 153      ( 18 Oct 2023 06:51 )             34.9       143    |  115    |  44     ----------------------------<  97        19 Oct 2023 06:49  4.9     |  24     |  4.76     141    |  114    |  54     ----------------------------<  107       18 Oct 2023 06:51  4.6     |  21     |  5.88     134    |  106    |  63     ----------------------------<  112       17 Oct 2023 12:49  5.3     |  22     |  6.64     Ca    9.0        19 Oct 2023 06:49  Ca    8.3        18 Oct 2023 06:51    Phos  4.5       17 Oct 2023 22:12    Mg     2.3       17 Oct 2023 22:12    TPro  7.3    /  Alb  3.6    /  TBili  0.4    /        17 Oct 2023 12:49  DBili  x      /  AST  32     /  ALT  62     /  AlkPhos  102          TPro  7.3    /  Alb  3.6    /  TBili  0.4    /  DBili  x      /  AST  32     /  ALT  62     /  AlkPhos  102    17 Oct 2023 12:49      Creatine Kinase, Serum: 71 U/L [26 - 192] (10-17 @ 22:12)  Creatine Kinase, Serum: 97 U/L [26 - 192] (10-17 @ 12:49)      Urine Microscopic-Add On (NC) (10.17.23 @ 12:49)   Red Blood Cell - Urine: 0 /HPF  White Blood Cell - Urine: 6 /HPF  Bacteria: Negative /HPF  Epithelial Cells: 2 /HPF  Cast: 0 /LPF      Urinalysis (10.17.23 @ 12:49)   Glucose Qualitative, Urine: Negative mg/dL  Blood, Urine: Negative  pH Urine: 5.5  Color: Yellow  Urine Appearance: Clear  Bilirubin: Negative  Ketone - Urine: Negative mg/dL  Specific Gravity: <1.005  Protein, Urine: Negative mg/dL  Urobilinogen: 0.2 mg/dL  Nitrite: Negative  Leukocyte Esterase Concentration: Small      RADIOLOGY & ADDITIONAL STUDIES:        ACC: [id number] EXAM:  US KIDNEYS AND BLADDER   ORDERED BY: TRINH VARGAS     PROCEDURE DATE:  10/18/2023          INTERPRETATION:  CLINICAL INFORMATION: Acute kidney injury    COMPARISON: None available.    TECHNIQUE: Sonography of the kidneys andbladder.    FINDINGS:  Right kidney: 13.3 cm. No renal mass, hydronephrosis or calculi.   Echogenic renal parenchyma.    Left kidney: 13.1 cm. No renal mass, hydronephrosis or calculi. Echogenic   renal parenchyma.    Urinary bladder: Underdistended.    IMPRESSION:  No hydronephrosis.    Echogenic kidneys bilaterally for which clinical correlation with   intrinsic medical renal disease is recommended.    --- End of Report ---      INTERPRETATION:  RADIOPHARMACEUTICAL:  99mTc-Mebrofenin  DOSE: 3.4 mCi IV    CLINICAL INFORMATION: 53 year old female with right upper quadrant   abdominal pain, referred to evaluate for acute cholecystitis    TECHNIQUE: Dynamic images of the anterior abdomen were obtained for   approximately 2 hours immediately following radiotracer injection. Static   images of the abdomen in the anterior, right anterior oblique and right   lateral projections were also obtained.    COMPARISON: No previous hepatobiliary scan for comparison    FINDINGS: There is prompt uptake of the injected radiotracer by the   hepatocytes. The gallbladder is first visualized at 15 minutes post   tracer injection and bowel activity by 90 minutes. There is normal tracer   clearance from the liver by the end of the study.    IMPRESSION: Normal hepatobiliary scan.    No scan evidence of acute cholecystitis.    --- End of Report ---

## 2023-10-19 NOTE — PROGRESS NOTE ADULT - SUBJECTIVE AND OBJECTIVE BOX
53-year-old female with past medical history of ulcerative colitis presents with abdominal pain. Patient states that Friday night she woke up and had a headache.  At that time she noticed that she had some epigastric pain and body aches.  She slept the whole day on Saturday and .  States that she felt weird, "loopy."  She had continued body aches, felt dizzy/lightheaded as if she was on a boat.  She took some [place] and Advil which did not relieve her symptoms.  She has continued upper abdominal pain in the epigastric region of the right upper quadrant.  Also reports decreased appetite and shortness of breath.  Of note, patient states that she had a cardiac work-up a few months ago including a stress test, echocardiogram and Holter monitor which were reported to be normal.  She has yearly colonoscopies and is due for 8 upcoming colonoscopy before 2024.  She is receiving Entyvio infusions every 8 weeks for her ulcerative colitis.  She has been taking workout supplements called [employer] recently and started at 45 workout.  She states that her exercise has increased recently, but she was working out with beach body previously. Denies fevers, chills, URI symptoms, chest pain, nausea, diarrhea, constipation.  She had 1 episode of vomiting upon my evaluation.        Review of Systems:  CONSTITUTIONAL: weakness present   EYES/ENT: No visual changes;  No vertigo or throat pain   NECK: No pain or stiffness  RESPIRATORY: No cough, wheezing, hemoptysis; No shortness of breath,   CARDIOVASCULAR: No chest pain or palpitations  GASTROINTESTINAL: No abdominal or epigastric pain. No nausea, vomiting, or hematemesis; No diarrhea or constipation.   GENITOURINARY: No dysuria, frequency or hematuria  NEUROLOGICAL: No numbness or weakness  SKIN: No itching, burning, rashes, or lesions   All other review of systems is negative unless indicated above    PHYSICAL EXAM:    Vital Signs Last 24 Hrs  T(C): 36.7 (19 Oct 2023 15:40), Max: 36.9 (19 Oct 2023 07:37)  T(F): 98 (19 Oct 2023 15:40), Max: 98.4 (19 Oct 2023 07:37)  HR: 66 (19 Oct 2023 15:40) (61 - 73)  BP: 129/77 (19 Oct 2023 15:40) (122/81 - 134/79)  BP(mean): 94 (18 Oct 2023 21:54) (94 - 94)  RR: 18 (19 Oct 2023 15:40) (17 - 18)  SpO2: 96% (19 Oct 2023 15:40) (96% - 98%)    Parameters below as of 19 Oct 2023 15:40  Patient On (Oxygen Delivery Method): room air        GENERAL: comfortable   HEAD:  Atraumatic, Normocephalic  EYES: EOMI, PERRLA, conjunctiva and sclera clear  HEENT: Moist mucous membranes  NECK: Supple, No JVD  NERVOUS SYSTEM:  Alert & Oriented X3, Motor Strength 5/5 B/L upper and lower extremities; DTRs 2+ intact and symmetric  CHEST/LUNG: Clear to auscultation bilaterally; No rales, rhonchi, wheezing, or rubs  HEART:S1S2 normal, no murmer  ABDOMEN: Soft, Nontender, Nondistended; Bowel sounds present  GENITOURINARY- Voiding, no palpable bladder  EXTREMITIES:  2+ Peripheral Pulses, No clubbing, cyanosis, or edema  MUSCULOSKELTAL- No muscle tenderness, Muscle tone normal, No joint tenderness, no Joint swelling, Joint range of motion-normal  SKIN-no rash, no lesion  PSYCH- Mood stable  LYMPH Node- No palpable lymph node    LABS:                        11.9   8.30  )-----------( 183      ( 19 Oct 2023 06:49 )             35.2     10-19    143  |  115<H>  |  44<H>  ----------------------------<  97  4.9   |  24  |  4.76<H>    Ca    9.0      19 Oct 2023 06:49  Phos  4.5     10-17  Mg     2.3     10-17        Urinalysis Basic - ( 19 Oct 2023 15:14 )    Color: Yellow / Appearance: Clear / S.007 / pH: x  Gluc: x / Ketone: Negative mg/dL  / Bili: Negative / Urobili: 0.2 mg/dL   Blood: x / Protein: Negative mg/dL / Nitrite: Negative   Leuk Esterase: Small / RBC: 0 /HPF / WBC 8 /HPF   Sq Epi: x / Non Sq Epi: 4 /HPF / Bacteria: Negative /HPF        CAPILLARY BLOOD GLUCOSE          CARDIAC MARKERS ( 17 Oct 2023 22:12 )  x     / x     / 71 U/L / x     / x            Standing medicine  acetaminophen     Tablet .. 650 milliGRAM(s) Oral every 6 hours PRN  aluminum hydroxide/magnesium hydroxide/simethicone Suspension 30 milliLiter(s) Oral every 4 hours PRN  bisacodyl 5 milliGRAM(s) Oral daily PRN  citalopram 20 milliGRAM(s) Oral daily  cloNIDine 0.2 milliGRAM(s) Oral at bedtime  HYDROmorphone  Injectable 0.2 milliGRAM(s) IV Push every 4 hours PRN  HYDROmorphone  Injectable 0.5 milliGRAM(s) IV Push every 4 hours PRN  influenza   Vaccine 0.5 milliLiter(s) IntraMuscular once  lidocaine   4% Patch 1 Patch Transdermal daily  melatonin 3 milliGRAM(s) Oral at bedtime PRN  multivitamin 1 Tablet(s) Oral daily  naloxone Injectable 0.4 milliGRAM(s) IV Push once  ondansetron Injectable 4 milliGRAM(s) IV Push every 6 hours PRN  polyethylene glycol 3350 17 Gram(s) Oral daily  senna 2 Tablet(s) Oral at bedtime  sodium chloride 0.9%. 1000 milliLiter(s) IV Continuous <Continuous>  sodium chloride 0.9%. 1000 milliLiter(s) IV Continuous <Continuous>  valACYclovir 1000 milliGRAM(s) Oral daily PRN

## 2023-10-20 LAB
ANION GAP SERPL CALC-SCNC: 5 MMOL/L — SIGNIFICANT CHANGE UP (ref 5–17)
ANION GAP SERPL CALC-SCNC: 5 MMOL/L — SIGNIFICANT CHANGE UP (ref 5–17)
BUN SERPL-MCNC: 27 MG/DL — HIGH (ref 7–23)
BUN SERPL-MCNC: 27 MG/DL — HIGH (ref 7–23)
CALCIUM SERPL-MCNC: 8.9 MG/DL — SIGNIFICANT CHANGE UP (ref 8.5–10.1)
CALCIUM SERPL-MCNC: 8.9 MG/DL — SIGNIFICANT CHANGE UP (ref 8.5–10.1)
CHLORIDE SERPL-SCNC: 113 MMOL/L — HIGH (ref 96–108)
CHLORIDE SERPL-SCNC: 113 MMOL/L — HIGH (ref 96–108)
CO2 SERPL-SCNC: 23 MMOL/L — SIGNIFICANT CHANGE UP (ref 22–31)
CO2 SERPL-SCNC: 23 MMOL/L — SIGNIFICANT CHANGE UP (ref 22–31)
CREAT SERPL-MCNC: 3.24 MG/DL — HIGH (ref 0.5–1.3)
CREAT SERPL-MCNC: 3.24 MG/DL — HIGH (ref 0.5–1.3)
EGFR: 16 ML/MIN/1.73M2 — LOW
EGFR: 16 ML/MIN/1.73M2 — LOW
GLUCOSE SERPL-MCNC: 102 MG/DL — HIGH (ref 70–99)
GLUCOSE SERPL-MCNC: 102 MG/DL — HIGH (ref 70–99)
OSMOLALITY UR: 235 MOSM/KG — SIGNIFICANT CHANGE UP (ref 50–1200)
OSMOLALITY UR: 235 MOSM/KG — SIGNIFICANT CHANGE UP (ref 50–1200)
POTASSIUM SERPL-MCNC: 4.6 MMOL/L — SIGNIFICANT CHANGE UP (ref 3.5–5.3)
POTASSIUM SERPL-MCNC: 4.6 MMOL/L — SIGNIFICANT CHANGE UP (ref 3.5–5.3)
POTASSIUM SERPL-SCNC: 4.6 MMOL/L — SIGNIFICANT CHANGE UP (ref 3.5–5.3)
POTASSIUM SERPL-SCNC: 4.6 MMOL/L — SIGNIFICANT CHANGE UP (ref 3.5–5.3)
SODIUM SERPL-SCNC: 141 MMOL/L — SIGNIFICANT CHANGE UP (ref 135–145)
SODIUM SERPL-SCNC: 141 MMOL/L — SIGNIFICANT CHANGE UP (ref 135–145)
SODIUM UR-SCNC: 43 MMOL/L — SIGNIFICANT CHANGE UP
SODIUM UR-SCNC: 43 MMOL/L — SIGNIFICANT CHANGE UP

## 2023-10-20 PROCEDURE — 99232 SBSQ HOSP IP/OBS MODERATE 35: CPT

## 2023-10-20 RX ADMIN — Medication 3 MILLIGRAM(S): at 21:45

## 2023-10-20 RX ADMIN — Medication 1 TABLET(S): at 09:19

## 2023-10-20 RX ADMIN — SENNA PLUS 2 TABLET(S): 8.6 TABLET ORAL at 21:36

## 2023-10-20 RX ADMIN — Medication 0.2 MILLIGRAM(S): at 21:36

## 2023-10-20 RX ADMIN — ONDANSETRON 4 MILLIGRAM(S): 8 TABLET, FILM COATED ORAL at 12:03

## 2023-10-20 RX ADMIN — Medication 650 MILLIGRAM(S): at 18:14

## 2023-10-20 RX ADMIN — CITALOPRAM 20 MILLIGRAM(S): 10 TABLET, FILM COATED ORAL at 09:19

## 2023-10-20 RX ADMIN — POLYETHYLENE GLYCOL 3350 17 GRAM(S): 17 POWDER, FOR SOLUTION ORAL at 09:19

## 2023-10-20 RX ADMIN — HYDROMORPHONE HYDROCHLORIDE 0.5 MILLIGRAM(S): 2 INJECTION INTRAMUSCULAR; INTRAVENOUS; SUBCUTANEOUS at 09:44

## 2023-10-20 RX ADMIN — ONDANSETRON 4 MILLIGRAM(S): 8 TABLET, FILM COATED ORAL at 18:14

## 2023-10-20 NOTE — PROGRESS NOTE ADULT - SUBJECTIVE AND OBJECTIVE BOX
53 ulcerative colitis presents with abdominal pain. Patient states that Friday night she woke up and had a headache and abdominal discomfort.  At that time she noticed that she had some epigastric pain and body aches.  She reports poor intake, poor hydration and was taking advil TTs daily since this started on Friday.  She had continued body aches, felt dizzy/lightheaded as if she was on a boat.  She is receiving Entyvio infusions every 8 weeks for her ulcerative colitis.  Presented to ED and noted with DANNY and was give IVF and underwent imaging and now a HIDA.       today    pt denies major complaints   tolerating po      PAST MEDICAL & SURGICAL HISTORY:  Ulcerative colitis      Ulcerative colitis      H/O  section    Home Medications:  CeleXA 20 mg oral tablet: 1 tab(s) orally once a day (17 Oct 2023 16:06)  cloNIDine 0.1 mg oral tablet: 2 tab(s) orally once a day (at bedtime) as needed (17 Oct 2023 16:20)  dexmethylphenidate 20 mg oral capsule, extended release: 1 cap(s) orally once a day as needed (17 Oct 2023 16:44)  Entyvio 300 mg intravenous injection: 300 milligram(s) intravenously every 8 weeks (17 Oct 2023 16:44)  Hair, Skin, Nails 5 mg oral capsule: 1 cap(s) orally once a day (17 Oct 2023 16:44)  metroNIDAZOLE 0.75% topical gel: Apply topically to affected area once a day to face (17 Oct 2023 16:44)  Multiple Vitamins oral tablet: 1 tab(s) orally once a day (17 Oct 2023 16:44)  tretinoin 0.05% topical gel: Apply topically to affected area once a day to face (17 Oct 2023 16:44)  valACYclovir 1 g oral tablet: 1 tab(s) orally once a day as needed for cold sores (17 Oct 2023 16:44)      MEDICATIONS  (STANDING):  citalopram 20 milliGRAM(s) Oral daily  cloNIDine 0.2 milliGRAM(s) Oral at bedtime  influenza   Vaccine 0.5 milliLiter(s) IntraMuscular once  lidocaine   4% Patch 1 Patch Transdermal daily  multivitamin 1 Tablet(s) Oral daily  naloxone Injectable 0.4 milliGRAM(s) IV Push once  polyethylene glycol 3350 17 Gram(s) Oral daily  senna 2 Tablet(s) Oral at bedtime  sodium chloride 0.9%. 1000 milliLiter(s) (75 mL/Hr) IV Continuous <Continuous>  sodium chloride 0.9%. 1000 milliLiter(s) (100 mL/Hr) IV Continuous <Continuous>      Allergies    No Known Allergies    Intolerances        SOCIAL HISTORY:  no etoh/cigg      FAMILY HISTORY:  No pertinent family history in first degree relatives        REVIEW OF SYSTEMS:    CONSTITUTIONAL: stable weakness, fevers or chills  EYES/ENT: No visual changes;  No vertigo or throat pain   NECK: No pain or stiffness  RESPIRATORY: No cough, wheezing, hemoptysis; No shortness of breath  CARDIOVASCULAR: No chest pain or palpitations  GASTROINTESTINAL: No abdominal or epigastric pain. No nausea, vomiting, or hematemesis; No diarrhea or constipation. No melena or hematochezia.  GENITOURINARY: No dysuria, frequency or hematuria  NEUROLOGICAL: No numbness or weakness  SKIN: No itching, burning, rashes, or lesions   All other review of systems is negative unless indicated above.    Vital Signs Last 24 Hrs  T(C): 36.9 (19 Oct 2023 07:37), Max: 36.9 (19 Oct 2023 07:37)  T(F): 98.4 (19 Oct 2023 07:37), Max: 98.4 (19 Oct 2023 07:37)  HR: 61 (19 Oct 2023 07:37) (61 - 73)  BP: 134/79 (19 Oct 2023 07:37) (122/81 - 134/79)  BP(mean): 94 (18 Oct 2023 21:54) (94 - 94)  RR: 17 (19 Oct 2023 07:37) (17 - 18)  SpO2: 97% (19 Oct 2023 07:37) (97% - 100%)    Parameters below as of 19 Oct 2023 07:37  Patient On (Oxygen Delivery Method): room air    I&O's Detail      PHYSICAL EXAM:    Constitutional: NAD   HEENT:  MM  Neck: No LAD, No JVD  Respiratory: dist  Cardiovascular: S1 and S2,   Extremities: No peripheral edema  Neurological: A/O x 3, no focal deficits  : No Valdes           LABS:                                   11.9   8.30  )-----------( 183      ( 19 Oct 2023 06:49 )             35.2                         11.5   7.18  )-----------( 153      ( 18 Oct 2023 06:51 )             34.9     141    |  113    |  27     ----------------------------<  102       20 Oct 2023 08:41  4.6     |  23     |  3.24     143    |  115    |  44     ----------------------------<  97        19 Oct 2023 06:49  4.9     |  24     |  4.76     141    |  114    |  54     ----------------------------<  107       18 Oct 2023 06:51  4.6     |  21     |  5.88     Ca    8.9        20 Oct 2023 08:41  Ca    9.0        19 Oct 2023 06:49    Phos  4.5       17 Oct 2023 22:12    Mg     2.3       17 Oct 2023 22:12    TPro  7.3    /  Alb  3.6    /  TBili  0.4    /        17 Oct 2023 12:49  DBili  x      /  AST  32     /  ALT  62     /  AlkPhos  102          143    |  115    |  44     ----------------------------<  97        19 Oct 2023 06:49  4.9     |  24     |  4.76     141    |  114    |  54     ----------------------------<  107       18 Oct 2023 06:51  4.6     |  21     |  5.88     134    |  106    |  63     ----------------------------<  112       17 Oct 2023 12:49  5.3     |  22     |  6.64     Ca    9.0        19 Oct 2023 06:49  Ca    8.3        18 Oct 2023 06:51    Phos  4.5       17 Oct 2023 22:12    Mg     2.3       17 Oct 2023 22:12    TPro  7.3    /  Alb  3.6    /  TBili  0.4    /        17 Oct 2023 12:49  DBili  x      /  AST  32     /  ALT  62     /  AlkPhos  102          TPro  7.3    /  Alb  3.6    /  TBili  0.4    /  DBili  x      /  AST  32     /  ALT  62     /  AlkPhos  102    17 Oct 2023 12:49      Creatine Kinase, Serum: 71 U/L [26 - 192] (10-17 @ 22:12)  Creatine Kinase, Serum: 97 U/L [26 - 192] (10-17 @ 12:49)      Urine Microscopic-Add On (NC) (10.17.23 @ 12:49)   Red Blood Cell - Urine: 0 /HPF  White Blood Cell - Urine: 6 /HPF  Bacteria: Negative /HPF  Epithelial Cells: 2 /HPF  Cast: 0 /LPF      Urinalysis (10.17.23 @ 12:49)   Glucose Qualitative, Urine: Negative mg/dL  Blood, Urine: Negative  pH Urine: 5.5  Color: Yellow  Urine Appearance: Clear  Bilirubin: Negative  Ketone - Urine: Negative mg/dL  Specific Gravity: <1.005  Protein, Urine: Negative mg/dL  Urobilinogen: 0.2 mg/dL  Nitrite: Negative  Leukocyte Esterase Concentration: Small      RADIOLOGY & ADDITIONAL STUDIES:        ACC: [id number] EXAM:  US KIDNEYS AND BLADDER   ORDERED BY: TRINH VARGAS     PROCEDURE DATE:  10/18/2023          INTERPRETATION:  CLINICAL INFORMATION: Acute kidney injury    COMPARISON: None available.    TECHNIQUE: Sonography of the kidneys andbladder.    FINDINGS:  Right kidney: 13.3 cm. No renal mass, hydronephrosis or calculi.   Echogenic renal parenchyma.    Left kidney: 13.1 cm. No renal mass, hydronephrosis or calculi. Echogenic   renal parenchyma.    Urinary bladder: Underdistended.    IMPRESSION:  No hydronephrosis.    Echogenic kidneys bilaterally for which clinical correlation with   intrinsic medical renal disease is recommended.    --- End of Report ---      INTERPRETATION:  RADIOPHARMACEUTICAL:  99mTc-Mebrofenin  DOSE: 3.4 mCi IV    CLINICAL INFORMATION: 53 year old female with right upper quadrant   abdominal pain, referred to evaluate for acute cholecystitis    TECHNIQUE: Dynamic images of the anterior abdomen were obtained for   approximately 2 hours immediately following radiotracer injection. Static   images of the abdomen in the anterior, right anterior oblique and right   lateral projections were also obtained.    COMPARISON: No previous hepatobiliary scan for comparison    FINDINGS: There is prompt uptake of the injected radiotracer by the   hepatocytes. The gallbladder is first visualized at 15 minutes post   tracer injection and bowel activity by 90 minutes. There is normal tracer   clearance from the liver by the end of the study.    IMPRESSION: Normal hepatobiliary scan.    No scan evidence of acute cholecystitis.    --- End of Report ---

## 2023-10-20 NOTE — PROGRESS NOTE ADULT - SUBJECTIVE AND OBJECTIVE BOX
53-year-old female with past medical history of ulcerative colitis presents with abdominal pain. Patient states that Friday night she woke up and had a headache.  At that time she noticed that she had some epigastric pain and body aches.  She slept the whole day on Saturday and Sunday.  States that she felt weird, "loopy."  She had continued body aches, felt dizzy/lightheaded as if she was on a boat.  She took some Lashawn-[place] and Advil which did not relieve her symptoms.  She has continued upper abdominal pain in the epigastric region of the right upper quadrant.  Also reports decreased appetite and shortness of breath.  Of note, patient states that she had a cardiac work-up a few months ago including a stress test, echocardiogram and Holter monitor which were reported to be normal.  She has yearly colonoscopies and is due for 8 upcoming colonoscopy before January 2024.  She is receiving Entyvio infusions every 8 weeks for her ulcerative colitis.  She has been taking workout supplements called [employer] recently and started at 45 workout.  She states that her exercise has increased recently, but she was working out with beach body previously. Denies fevers, chills, URI symptoms, chest pain, nausea, diarrhea, constipation.  She had 1 episode of vomiting upon my evaluation.        Review of Systems:  CONSTITUTIONAL: weakness present, generalized body pain    EYES/ENT: No visual changes;  No vertigo or throat pain   NECK: No pain or stiffness  RESPIRATORY: No cough, wheezing, hemoptysis; No shortness of breath,   CARDIOVASCULAR: No chest pain or palpitations  GASTROINTESTINAL: No abdominal or epigastric pain. No nausea, vomiting, or hematemesis; No diarrhea or constipation.   GENITOURINARY: No dysuria, frequency or hematuria  NEUROLOGICAL: No numbness or weakness  SKIN: No itching, burning, rashes, or lesions   All other review of systems is negative unless indicated above    PHYSICAL EXAM:  Vital Signs Last 24 Hrs  T(C): 36.6 (20 Oct 2023 08:37), Max: 36.8 (19 Oct 2023 21:55)  T(F): 97.9 (20 Oct 2023 08:37), Max: 98.2 (19 Oct 2023 21:55)  HR: 63 (20 Oct 2023 08:37) (61 - 66)  BP: 126/78 (20 Oct 2023 08:37) (126/78 - 145/85)  BP(mean): --  RR: 18 (20 Oct 2023 08:37) (18 - 18)  SpO2: 99% (20 Oct 2023 08:37) (96% - 99%)    Parameters below as of 20 Oct 2023 08:37  Patient On (Oxygen Delivery Method): room air            GENERAL: comfortable   HEAD:  Atraumatic, Normocephalic  EYES: EOMI, PERRLA, conjunctiva and sclera clear  HEENT: Moist mucous membranes  NECK: Supple, No JVD  NERVOUS SYSTEM:  Alert & Oriented X3, Motor Strength 5/5 B/L upper and lower extremities; DTRs 2+ intact and symmetric  CHEST/LUNG: Clear to auscultation bilaterally; No rales, rhonchi, wheezing, or rubs  HEART:S1S2 normal, no murmer  ABDOMEN: Soft, Nontender, Nondistended; Bowel sounds present  GENITOURINARY- Voiding, no palpable bladder  EXTREMITIES:  2+ Peripheral Pulses, No clubbing, cyanosis, or edema  MUSCULOSKELTAL- No muscle tenderness, Muscle tone normal, No joint tenderness, no Joint swelling, Joint range of motion-normal  SKIN-no rash, no lesion  PSYCH- Mood stable  LYMPH Node- No palpable lymph node                          11.9   8.30  )-----------( 183      ( 19 Oct 2023 06:49 )             35.2     10-20    141  |  113<H>  |  27<H>  ----------------------------<  102<H>  4.6   |  23  |  3.24<H>    Ca    8.9      20 Oct 2023 08:41            Urinalysis Basic - ( 20 Oct 2023 08:41 )    Color: x / Appearance: x / SG: x / pH: x  Gluc: 102 mg/dL / Ketone: x  / Bili: x / Urobili: x   Blood: x / Protein: x / Nitrite: x   Leuk Esterase: x / RBC: x / WBC x   Sq Epi: x / Non Sq Epi: x / Bacteria: x          CAPILLARY BLOOD GLUCOSE          MEDICATIONS  (STANDING):  citalopram 20 milliGRAM(s) Oral daily  cloNIDine 0.2 milliGRAM(s) Oral at bedtime  influenza   Vaccine 0.5 milliLiter(s) IntraMuscular once  lidocaine   4% Patch 1 Patch Transdermal daily  multivitamin 1 Tablet(s) Oral daily  naloxone Injectable 0.4 milliGRAM(s) IV Push once  polyethylene glycol 3350 17 Gram(s) Oral daily  senna 2 Tablet(s) Oral at bedtime  sodium chloride 0.9%. 1000 milliLiter(s) (75 mL/Hr) IV Continuous <Continuous>  sodium chloride 0.9%. 1000 milliLiter(s) (100 mL/Hr) IV Continuous <Continuous>    MEDICATIONS  (PRN):  acetaminophen     Tablet .. 650 milliGRAM(s) Oral every 6 hours PRN Moderate Pain (4 - 6)  aluminum hydroxide/magnesium hydroxide/simethicone Suspension 30 milliLiter(s) Oral every 4 hours PRN Dyspepsia  bisacodyl 5 milliGRAM(s) Oral daily PRN Constipation  melatonin 3 milliGRAM(s) Oral at bedtime PRN Insomnia  ondansetron Injectable 4 milliGRAM(s) IV Push every 6 hours PRN Nausea and/or Vomiting  valACYclovir 1000 milliGRAM(s) Oral daily PRN cold sore

## 2023-10-21 ENCOUNTER — TRANSCRIPTION ENCOUNTER (OUTPATIENT)
Age: 53
End: 2023-10-21

## 2023-10-21 VITALS
DIASTOLIC BLOOD PRESSURE: 84 MMHG | HEART RATE: 62 BPM | RESPIRATION RATE: 18 BRPM | OXYGEN SATURATION: 99 % | SYSTOLIC BLOOD PRESSURE: 144 MMHG | TEMPERATURE: 98 F

## 2023-10-21 LAB
ANION GAP SERPL CALC-SCNC: 3 MMOL/L — LOW (ref 5–17)
ANION GAP SERPL CALC-SCNC: 3 MMOL/L — LOW (ref 5–17)
BUN SERPL-MCNC: 23 MG/DL — SIGNIFICANT CHANGE UP (ref 7–23)
BUN SERPL-MCNC: 23 MG/DL — SIGNIFICANT CHANGE UP (ref 7–23)
CALCIUM SERPL-MCNC: 9 MG/DL — SIGNIFICANT CHANGE UP (ref 8.5–10.1)
CALCIUM SERPL-MCNC: 9 MG/DL — SIGNIFICANT CHANGE UP (ref 8.5–10.1)
CHLORIDE SERPL-SCNC: 111 MMOL/L — HIGH (ref 96–108)
CHLORIDE SERPL-SCNC: 111 MMOL/L — HIGH (ref 96–108)
CO2 SERPL-SCNC: 27 MMOL/L — SIGNIFICANT CHANGE UP (ref 22–31)
CO2 SERPL-SCNC: 27 MMOL/L — SIGNIFICANT CHANGE UP (ref 22–31)
CREAT SERPL-MCNC: 2.18 MG/DL — HIGH (ref 0.5–1.3)
CREAT SERPL-MCNC: 2.18 MG/DL — HIGH (ref 0.5–1.3)
EGFR: 26 ML/MIN/1.73M2 — LOW
EGFR: 26 ML/MIN/1.73M2 — LOW
GLUCOSE SERPL-MCNC: 104 MG/DL — HIGH (ref 70–99)
GLUCOSE SERPL-MCNC: 104 MG/DL — HIGH (ref 70–99)
MAGNESIUM SERPL-MCNC: 1.8 MG/DL — SIGNIFICANT CHANGE UP (ref 1.6–2.6)
MAGNESIUM SERPL-MCNC: 1.8 MG/DL — SIGNIFICANT CHANGE UP (ref 1.6–2.6)
PHOSPHATE SERPL-MCNC: 3.8 MG/DL — SIGNIFICANT CHANGE UP (ref 2.5–4.5)
PHOSPHATE SERPL-MCNC: 3.8 MG/DL — SIGNIFICANT CHANGE UP (ref 2.5–4.5)
POTASSIUM SERPL-MCNC: 4.9 MMOL/L — SIGNIFICANT CHANGE UP (ref 3.5–5.3)
POTASSIUM SERPL-MCNC: 4.9 MMOL/L — SIGNIFICANT CHANGE UP (ref 3.5–5.3)
POTASSIUM SERPL-SCNC: 4.9 MMOL/L — SIGNIFICANT CHANGE UP (ref 3.5–5.3)
POTASSIUM SERPL-SCNC: 4.9 MMOL/L — SIGNIFICANT CHANGE UP (ref 3.5–5.3)
SODIUM SERPL-SCNC: 141 MMOL/L — SIGNIFICANT CHANGE UP (ref 135–145)
SODIUM SERPL-SCNC: 141 MMOL/L — SIGNIFICANT CHANGE UP (ref 135–145)

## 2023-10-21 PROCEDURE — 99239 HOSP IP/OBS DSCHRG MGMT >30: CPT

## 2023-10-21 RX ADMIN — Medication 650 MILLIGRAM(S): at 15:04

## 2023-10-21 RX ADMIN — INFLUENZA VIRUS VACCINE 0.5 MILLILITER(S): 15; 15; 15; 15 SUSPENSION INTRAMUSCULAR at 15:12

## 2023-10-21 RX ADMIN — ONDANSETRON 4 MILLIGRAM(S): 8 TABLET, FILM COATED ORAL at 08:55

## 2023-10-21 RX ADMIN — Medication 1 TABLET(S): at 10:23

## 2023-10-21 RX ADMIN — CITALOPRAM 20 MILLIGRAM(S): 10 TABLET, FILM COATED ORAL at 10:22

## 2023-10-21 RX ADMIN — Medication 650 MILLIGRAM(S): at 13:17

## 2023-10-21 NOTE — DISCHARGE NOTE PROVIDER - NSDCCPCAREPLAN_GEN_ALL_CORE_FT
PRINCIPAL DISCHARGE DIAGNOSIS  Diagnosis: ARF (acute renal failure)  Assessment and Plan of Treatment: please avoid sports drinks, nutritional supplements.  you can take tylenol for pain but please avoid NSAIDS (examples are ibuprofen, advil, motrin) as they can cause kidney failure.  see your PCP in 1 week and check bloodwork.

## 2023-10-21 NOTE — DISCHARGE NOTE PROVIDER - HOSPITAL COURSE
53-year-old female with past medical history of ulcerative colitis presents with abdominal pain. Patient states that Friday night she woke up and had a headache.  At that time she noticed that she had some epigastric pain and body aches.  She slept the whole day on Saturday and Sunday.  States that she felt weird, "loopy."  She had continued body aches, felt dizzy/lightheaded as if she was on a boat.  She took some Lashawn-[place] and Advil which did not relieve her symptoms.  She has continued upper abdominal pain in the epigastric region of the right upper quadrant.  Also reports decreased appetite and shortness of breath.  Of note, patient states that she had a cardiac work-up a few months ago including a stress test, echocardiogram and Holter monitor which were reported to be normal.  She has yearly colonoscopies and is due for 8 upcoming colonoscopy before January 2024.  She is receiving Entyvio infusions every 8 weeks for her ulcerative colitis.  She has been taking workout supplements called [employer] recently and started at 45 workout.  She states that her exercise has increased recently, but she was working out with beach body previously.     HOSPITAL COURSE:     1. Acute kidney injury likely renal etiology possible ATN in the setting of workout supplements   -s/p iv fluids; pt counseled NSAIDs, nephrotoxic agents   -discussed with nephrology, Cr better, will dc home today, check labs in a week.      2. Trace gallbladder mural thickening/pericholecystic fluid  -no cholecystitis     3. Small right pleural effusion and small volume pelvic fluid.    -etiology not clear  -no clinical symptoms     4. Cannot exclude mural thickening involving the descending colon  h/o Ulcerative colitis, stable   - Outpt f/u with GI for colonoscopy  -discussed this with pt     5. 4 mm subpleural nodular opacity left lung base  - f/u with CT chest outpt   -discussed this with pt     OTHER DETAILS:   10/21 - no cp palps sob abdo pain tingling or numbness    PHYSICAL EXAM:  T(F): 98.1 (21 Oct 2023 08:41), Max: 98.1 (20 Oct 2023 15:10)  HR: 54 (21 Oct 2023 08:41) (54 - 67)  BP: 144/86 (21 Oct 2023 08:41) (123/82 - 155/78)  RR: 18 (21 Oct 2023 08:41) (18 - 19)  SpO2: 99% (21 Oct 2023 08:41) (97% - 99%)/RA   GENERAL: NAD, able to lie flat in bed  HEAD:  Atraumatic, Normocephalic  EYES: EOMI, PERRLA, normal sclera  ENT: Moist mucous membranes  NECK: Supple, No JVD, no nuchal rigidity  CHEST/LUNG: Clear to auscultation bilaterally; No rales, rhonchi, wheezing, or rubs. Unlabored respirations  HEART: Regular rate and rhythm; No murmurs, rubs, or gallops  ABDOMEN: Bowel sounds present; Soft, Nontender, Nondistended. No hepatomegaly  EXTREMITIES:  no pitting bilaterally  NERVOUS SYSTEM:  Alert & Oriented X3, speech clear. No focal motor or sensory deficits  MSK: FROM all 4 extremities, full and equal strength  SKIN: No rashes or lesions    LABS: All Labs Reviewed:  141  |  111<H>  |  23  ----------------------------<  104<H>  4.9   |  27  |  2.18<H>    RADIOLOGY/EKG:  < from: NM Hepatobiliary Imaging (10.18.23 @ 10:58) >Normal hepatobiliary scan.  No scan evidence of acute cholecystitis.  < from: US Kidney and Bladder (10.18.23 @ 08:54) >  No hydronephrosis.  Echogenic kidneys bilaterally for which clinical correlation with   intrinsic medical renal disease is recommended.    time spent on discharge- 50 mins

## 2023-10-21 NOTE — DISCHARGE NOTE NURSING/CASE MANAGEMENT/SOCIAL WORK - NSDCVIVACCINE_GEN_ALL_CORE_FT
influenza, injectable, quadrivalent, preservative free; 21-Oct-2023 15:12; Emily Nguyen (RN); [employer]; [id number] (Exp. Date: 21-Jun-2024); IntraMuscular; Deltoid Left.; 0.5 milliLiter(s); VIS (VIS Published: 06-Aug-2021, VIS Presented: 21-Oct-2023);

## 2023-10-21 NOTE — PROGRESS NOTE ADULT - ASSESSMENT
53 ulcerative colitis presents with abdominal pain. Patient states that Friday night she woke up and had a headache and abdominal discomfort.      DANNY - sec to appears to be ATN - multifactorial from pre renal and NSAID     -Renal normalizing, trending towards baseline  -Optimize intake  -No further IVF  -Trend bmp    Abdominal pain - hx of UC   - Surgical/medical fup     Dr Simms to resume care tomorrow 
  1. Acute kidney injury likely renal etiology possible ATN in the setting of workout supplements   -ct to monitor, iv fluids        2. Trace gallbladder mural thickening/pericholecystic fluid  -no cholecystitis     3. Small right pleural effusion and small volume pelvic fluid.        4. Cannot exclude mural thickening involving the descending colon  - Outpt f/u with GI for colonoscop    5. 4 mm subpleural nodular opacity left lung base  - f/u with CT chest outpt     6. History of ulcerative colitis  - c/w home medications; verified with pt at the bedside    
  1. Acute kidney injury likely renal etiology possible ATN in the setting of workout supplements   -s/p iv fluids   -discussed with nephrology team-Dr. Nicole. Plan is to watch her lab tomorrow without any IV fluids and decide about further management after following up with Dr. Simms(Nephrologist) tomorrow        2. Trace gallbladder mural thickening/pericholecystic fluid  -no cholecystitis   -etiology not clear    3. Small right pleural effusion and small volume pelvic fluid.    -etiology not clear  -no clinical symptoms     4. Cannot exclude mural thickening involving the descending colon  - Outpt f/u with GI for colonoscop  -discussed this with pt     5. 4 mm subpleural nodular opacity left lung base  - f/u with CT chest outpt   -discussed this with pt     6. History of ulcerative colitis  - c/w home medications; verified with pt at the bedside    #No indication for IV narcotics or any narcotics pain medicine. Stop it, Tyelnol for pain     #Above discussed with pt extensively, all questions have been answered   
53 ulcerative colitis presents with abdominal pain. Patient states that Friday night she woke up and had a headache and abdominal discomfort.  At that time she noticed that she had some epigastric pain and body aches.  She reports poor intake, poor hydration and was taking advil TTs daily since this started on Friday.  She had continued body aches, felt dizzy/lightheaded as if she was on a boat.  She is receiving Entyvio infusions every 8 weeks for her ulcerative colitis.     DANNY - sec to appears to be ATN - multifactorial med induced - workuput suplements and NSAID    negative/bland UA, and renal sono no hydro , mild echogenicty noted      - CPK normal - no evid of Rhabdo  - contineu to trend labs   - No further nsaids. workout supplements and avoid energy drinks advised   - Optimize intake  - repeat UA is  bland     Abdominal pain - hx of UC   - HIDA negative   - Surgical/medical fup     Dr Simms to resume care tomorrow 
42 yo fem with UC, DANNY, negat HIDA scan  -REg diet  -Will sign off  -Nephrology follow up, medication related DANNY?
53 ulcerative colitis presents with abdominal pain. Patient states that Friday night she woke up and had a headache and abdominal discomfort.  At that time she noticed that she had some epigastric pain and body aches.  She reports poor intake, poor hydration and was taking advil TTs daily since this started on Friday.  She had continued body aches, felt dizzy/lightheaded as if she was on a boat.  She is receiving Entyvio infusions every 8 weeks for her ulcerative colitis.     DANNY - sec to appears to be ATN - multifactorial med induced    negative/bland UA, and renal sono no hydro , mild echogenicty noted   pt admits to workout supplements - [employer]AGE which contain 188mg of caffeine while also on [employer] energy drinks ( also containing caffeine ) while taking Clonidine for anxiety and desmethylphenidate for ADHD and NSAID use   - check urine lytes    - CPK normal - no evid of Rhabdo  - continue IVF x 12 hrs to maintain UOp and trend AM labs to ensure continued improvement   - No further nsaids. workout supplements and avoid energy drinks advised   - Optimize intake  - Trend labs  - can consider further workup/ serology if rise in function    Abdominal pain - hx of UC   - HIDA negative   - Surgical/medical fup     thanks, will follow  
54 y/o F presents with abdominal pain     1. Acute kidney injury likely renal etiology possible ATN in the setting of workout supplements r/o rhabdomyolysis   - Admit to med/surg   - Cr 6.64 (baseline ~0.5), monitor closely - mild improvement today, continue to monitor  - BUN/Cr ratio = 9 -> suggests intrinsic renal disase    - renal US  IMPRESSION:  No hydronephrosis.    Echogenic kidneys bilaterally for which clinical correlation with   intrinsic medical renal disease is recommended.    -Continue IV fluids at this time   - Avoid nephrotoxic medications   - Strict I+Os   - f/u CK level,   - Nephrology consult - Dr. Simms - recommendations appreciated     2. Trace gallbladder mural thickening/pericholecystic fluid  -  HIDA scan - negative   - Pt does not have any CBD dilation on imaging, no fever, no leukocytosis  Abdominal pain improving   - Pain control   - Anti-emetics   - Clear liquid diet for now; advance as tolerated   - Surgery recs appreciated     3. Small right pleural effusion and small volume pelvic fluid.   - BNP -576  Order echocardiogram     4. Cannot exclude mural thickening involving the descending colon  - Outpt f/u with GI for colonoscopy (scheduled at the end of the year outpt)     5. 4 mm subpleural nodular opacity left lung base  - f/u with CT chest outpt     6. History of ulcerative colitis  - c/w home medications; verified with pt at the bedside    DVT ppx: SCDs, encourage ambulation   Code status: Full code   Emergency contact: Rene Madden () [phone]

## 2023-10-21 NOTE — DISCHARGE NOTE NURSING/CASE MANAGEMENT/SOCIAL WORK - NSDCPEFALRISK_GEN_ALL_CORE
For information on Fall & Injury Prevention, visit: [email or web address] OR  [email or web address] OR  https://www.cdc.gov/steadi/patient.html

## 2023-10-21 NOTE — DISCHARGE NOTE PROVIDER - CARE PROVIDER_API CALL
Fifi Markham  Internal Medicine  [address], [address]  [place] [address]  Phone: [phone]  Fax: [phone]  Follow Up Time: 1 week    Mitch Simms  Nephrology  [address] [place] [address]  Phone: [phone]  Fax: [phone]  Follow Up Time: 1 week

## 2023-10-21 NOTE — DISCHARGE NOTE NURSING/CASE MANAGEMENT/SOCIAL WORK - PATIENT PORTAL LINK FT
You can access the FollowMyHealth Patient Portal offered by [facility] by registering at the following website: [email or web address]. By joining [employer]’s FollowMyHealth portal, you will also be able to view your health information using other applications (apps) compatible with our system.

## 2023-10-21 NOTE — DISCHARGE NOTE PROVIDER - NSDCMRMEDTOKEN_GEN_ALL_CORE_FT
CeleXA 20 mg oral tablet: 1 tab(s) orally once a day  cloNIDine 0.1 mg oral tablet: 2 tab(s) orally once a day (at bedtime) as needed  dexmethylphenidate 20 mg oral capsule, extended release: 1 cap(s) orally once a day as needed  Entyvio 300 mg intravenous injection: 300 milligram(s) intravenously every 8 weeks  Hair, Skin, Nails 5 mg oral capsule: 1 cap(s) orally once a day  metroNIDAZOLE 0.75% topical gel: Apply topically to affected area once a day to face  Multiple Vitamins oral tablet: 1 tab(s) orally once a day  tretinoin 0.05% topical gel: Apply topically to affected area once a day to face  valACYclovir 1 g oral tablet: 1 tab(s) orally once a day as needed for cold sores

## 2023-10-21 NOTE — PROGRESS NOTE ADULT - SUBJECTIVE AND OBJECTIVE BOX
Patient is a 53y Female who reports no complaints as new, mild nausea      MEDICATIONS  (STANDING):  citalopram 20 milliGRAM(s) Oral daily  cloNIDine 0.2 milliGRAM(s) Oral at bedtime  influenza   Vaccine 0.5 milliLiter(s) IntraMuscular once  lidocaine   4% Patch 1 Patch Transdermal daily  multivitamin 1 Tablet(s) Oral daily  naloxone Injectable 0.4 milliGRAM(s) IV Push once  polyethylene glycol 3350 17 Gram(s) Oral daily  senna 2 Tablet(s) Oral at bedtime  sodium chloride 0.9%. 1000 milliLiter(s) (75 mL/Hr) IV Continuous <Continuous>  sodium chloride 0.9%. 1000 milliLiter(s) (100 mL/Hr) IV Continuous <Continuous>    MEDICATIONS  (PRN):  acetaminophen     Tablet .. 650 milliGRAM(s) Oral every 6 hours PRN Moderate Pain (4 - 6)  aluminum hydroxide/magnesium hydroxide/simethicone Suspension 30 milliLiter(s) Oral every 4 hours PRN Dyspepsia  bisacodyl 5 milliGRAM(s) Oral daily PRN Constipation  melatonin 3 milliGRAM(s) Oral at bedtime PRN Insomnia  ondansetron Injectable 4 milliGRAM(s) IV Push every 6 hours PRN Nausea and/or Vomiting  valACYclovir 1000 milliGRAM(s) Oral daily PRN cold sore        T(C): , Max: 36.7 (10-20-23 @ 15:10)  T(F): , Max: 98.1 (10-20-23 @ 15:10)  HR: 54 (10-21-23 @ 08:41)  BP: 144/86 (10-21-23 @ 08:41)  BP(mean): --  RR: 18 (10-21-23 @ 08:41)  SpO2: 99% (10-21-23 @ 08:41)  Wt(kg): --        PHYSICAL EXAM:    Constitutional: NAD, well-groomed, well-developed  HEENT: PERRLA, EOMI,  MMM  Neck: No LAD, No JVD  Respiratory: CTAB  Cardiovascular: S1 and S2, RRR  Gastrointestinal: BS+, soft, NT/ND  Extremities: No peripheral edema  Neurological: A/O x 3, no focal deficits  Psychiatric: Normal mood, normal affect  : No Valdes  Skin: No rashes  Access: Not applicable        LABS:    21 Oct 2023 12:41    141    |  111    |  23     ----------------------------<  104    4.9     |  27     |  2.18   20 Oct 2023 08:41    141    |  113    |  27     ----------------------------<  102    4.6     |  23     |  3.24   19 Oct 2023 06:49    143    |  115    |  44     ----------------------------<  97     4.9     |  24     |  4.76   18 Oct 2023 06:51    141    |  114    |  54     ----------------------------<  107    4.6     |  21     |  5.88     Ca    9.0        21 Oct 2023 12:41  Ca    8.9        20 Oct 2023 08:41  Ca    9.0        19 Oct 2023 06:49  Ca    8.3        18 Oct 2023 06:51  Phos  3.8       21 Oct 2023 12:41  Phos  4.5       17 Oct 2023 22:12  Mg     1.8       21 Oct 2023 12:41  Mg     2.3       17 Oct 2023 22:12            Urine Studies:  Urinalysis Basic - ( 21 Oct 2023 12:41 )    Color: x / Appearance: x / SG: x / pH: x  Gluc: 104 mg/dL / Ketone: x  / Bili: x / Urobili: x   Blood: x / Protein: x / Nitrite: x   Leuk Esterase: x / RBC: x / WBC x   Sq Epi: x / Non Sq Epi: x / Bacteria: x      Sodium, Random Urine: 43 mmol/L (10-19 @ 15:14)  Osmolality, Random Urine: 235 mosm/Kg (10-19 @ 15:14)  Creatinine, Random Urine: 64 mg/dL (10-19 @ 15:14)  Protein/Creatinine Ratio Calculation: 0.2 Ratio (10-19 @ 15:14)        RADIOLOGY & ADDITIONAL STUDIES:

## 2023-10-21 NOTE — DISCHARGE NOTE PROVIDER - PROVIDER TOKENS
PROVIDER:[TOKEN:[[id number]],FOLLOWUP:[1 week]],PROVIDER:[TOKEN:[[id number]],FOLLOWUP:[1 week]]

## 2023-10-27 ENCOUNTER — APPOINTMENT (OUTPATIENT)
Dept: INTERNAL MEDICINE | Facility: CLINIC | Age: 53
End: 2023-10-27
Payer: COMMERCIAL

## 2023-10-27 VITALS
WEIGHT: 151 LBS | HEIGHT: 62 IN | HEART RATE: 76 BPM | DIASTOLIC BLOOD PRESSURE: 80 MMHG | BODY MASS INDEX: 27.79 KG/M2 | SYSTOLIC BLOOD PRESSURE: 128 MMHG | OXYGEN SATURATION: 97 %

## 2023-10-27 DIAGNOSIS — N28.9 DISORDER OF KIDNEY AND URETER, UNSPECIFIED: ICD-10-CM

## 2023-10-27 PROCEDURE — 99214 OFFICE O/P EST MOD 30 MIN: CPT

## 2023-10-28 LAB
ALBUMIN SERPL ELPH-MCNC: 4.6 G/DL
ALP BLD-CCNC: 81 U/L
ALT SERPL-CCNC: 41 U/L
ANION GAP SERPL CALC-SCNC: 11 MMOL/L
AST SERPL-CCNC: 30 U/L
BASOPHILS # BLD AUTO: 0.08 K/UL
BASOPHILS NFR BLD AUTO: 0.8 %
BILIRUB SERPL-MCNC: 0.4 MG/DL
BUN SERPL-MCNC: 16 MG/DL
CALCIUM SERPL-MCNC: 9.8 MG/DL
CHLORIDE SERPL-SCNC: 105 MMOL/L
CO2 SERPL-SCNC: 24 MMOL/L
CREAT SERPL-MCNC: 0.96 MG/DL
EGFR: 71 ML/MIN/1.73M2
EOSINOPHIL # BLD AUTO: 0.1 K/UL
EOSINOPHIL NFR BLD AUTO: 1 %
GLUCOSE SERPL-MCNC: 94 MG/DL
HCT VFR BLD CALC: 41 %
HGB BLD-MCNC: 13.1 G/DL
IMM GRANULOCYTES NFR BLD AUTO: 0.4 %
LYMPHOCYTES # BLD AUTO: 1.93 K/UL
LYMPHOCYTES NFR BLD AUTO: 18.6 %
MAN DIFF?: NORMAL
MCHC RBC-ENTMCNC: 30.9 PG
MCHC RBC-ENTMCNC: 32 GM/DL
MCV RBC AUTO: 96.7 FL
MONOCYTES # BLD AUTO: 0.74 K/UL
MONOCYTES NFR BLD AUTO: 7.1 %
NEUTROPHILS # BLD AUTO: 7.46 K/UL
NEUTROPHILS NFR BLD AUTO: 72.1 %
PLATELET # BLD AUTO: 294 K/UL
POTASSIUM SERPL-SCNC: 5.1 MMOL/L
PROT SERPL-MCNC: 7 G/DL
RBC # BLD: 4.24 M/UL
RBC # FLD: 13.2 %
SODIUM SERPL-SCNC: 140 MMOL/L
WBC # FLD AUTO: 10.35 K/UL

## 2023-10-29 PROBLEM — N28.9 RENAL INSUFFICIENCY: Status: ACTIVE | Noted: 2023-10-27

## 2023-11-01 ENCOUNTER — APPOINTMENT (OUTPATIENT)
Dept: OTOLARYNGOLOGY | Facility: CLINIC | Age: 53
End: 2023-11-01

## 2023-11-02 DIAGNOSIS — Z87.19 PERSONAL HISTORY OF OTHER DISEASES OF THE DIGESTIVE SYSTEM: ICD-10-CM

## 2023-11-02 DIAGNOSIS — R91.8 OTHER NONSPECIFIC ABNORMAL FINDING OF LUNG FIELD: ICD-10-CM

## 2023-11-02 DIAGNOSIS — N17.0 ACUTE KIDNEY FAILURE WITH TUBULAR NECROSIS: ICD-10-CM

## 2023-11-02 DIAGNOSIS — J90 PLEURAL EFFUSION, NOT ELSEWHERE CLASSIFIED: ICD-10-CM

## 2023-11-13 ENCOUNTER — APPOINTMENT (OUTPATIENT)
Dept: OTOLARYNGOLOGY | Facility: CLINIC | Age: 53
End: 2023-11-13
Payer: COMMERCIAL

## 2023-11-13 VITALS — WEIGHT: 151 LBS | HEIGHT: 62 IN | BODY MASS INDEX: 27.79 KG/M2

## 2023-11-13 DIAGNOSIS — G47.33 OBSTRUCTIVE SLEEP APNEA (ADULT) (PEDIATRIC): ICD-10-CM

## 2023-11-13 DIAGNOSIS — R06.83 SNORING: ICD-10-CM

## 2023-11-13 DIAGNOSIS — J34.2 DEVIATED NASAL SEPTUM: ICD-10-CM

## 2023-11-13 DIAGNOSIS — H61.21 IMPACTED CERUMEN, RIGHT EAR: ICD-10-CM

## 2023-11-13 DIAGNOSIS — H61.23 IMPACTED CERUMEN, BILATERAL: ICD-10-CM

## 2023-11-13 PROCEDURE — 92557 COMPREHENSIVE HEARING TEST: CPT

## 2023-11-13 PROCEDURE — 92567 TYMPANOMETRY: CPT

## 2023-11-13 PROCEDURE — 99214 OFFICE O/P EST MOD 30 MIN: CPT | Mod: 25

## 2023-11-13 PROCEDURE — 31231 NASAL ENDOSCOPY DX: CPT

## 2023-12-29 ENCOUNTER — APPOINTMENT (OUTPATIENT)
Dept: INTERNAL MEDICINE | Facility: CLINIC | Age: 53
End: 2023-12-29
Payer: COMMERCIAL

## 2023-12-29 VITALS
DIASTOLIC BLOOD PRESSURE: 68 MMHG | HEIGHT: 62 IN | SYSTOLIC BLOOD PRESSURE: 124 MMHG | OXYGEN SATURATION: 99 % | TEMPERATURE: 98 F | HEART RATE: 67 BPM | WEIGHT: 153 LBS | BODY MASS INDEX: 28.16 KG/M2

## 2023-12-29 DIAGNOSIS — Z87.448 PERSONAL HISTORY OF OTHER DISEASES OF URINARY SYSTEM: ICD-10-CM

## 2023-12-29 PROCEDURE — 99213 OFFICE O/P EST LOW 20 MIN: CPT

## 2023-12-29 NOTE — HISTORY OF PRESENT ILLNESS
[FreeTextEntry8] : Patient here with complaints of not feeling well for the last 4-5 days. She reports productive cough, alternating clear and dark sputum. Feels very headachy, achy and fatigued. Her son was ill with bronchitis. - but mostly better now.  Additional exposures on [place] to ill relatives. Has never had COVID-

## 2023-12-29 NOTE — PHYSICAL EXAM
[Normal Sclera/Conjunctiva] : normal sclera/conjunctiva [Normal Outer Ear/Nose] : the outer ears and nose were normal in appearance [Normal Supraclavicular Nodes] : no supraclavicular lymphadenopathy [Normal Anterior Cervical Nodes] : no anterior cervical lymphadenopathy [No Focal Deficits] : no focal deficits [Normal] : affect was normal and insight and judgment were intact [de-identified] : mild erythema posterior pharyngeal wall

## 2023-12-31 LAB
INFLUENZA A RESULT: NOT DETECTED
INFLUENZA B RESULT: NOT DETECTED
RESP SYN VIRUS RESULT: DETECTED
SARS-COV-2 RESULT: NOT DETECTED

## 2024-01-03 ENCOUNTER — APPOINTMENT (OUTPATIENT)
Dept: RADIOLOGY | Facility: CLINIC | Age: 54
End: 2024-01-03
Payer: COMMERCIAL

## 2024-01-03 ENCOUNTER — APPOINTMENT (OUTPATIENT)
Dept: INTERNAL MEDICINE | Facility: CLINIC | Age: 54
End: 2024-01-03
Payer: COMMERCIAL

## 2024-01-03 ENCOUNTER — OUTPATIENT (OUTPATIENT)
Dept: OUTPATIENT SERVICES | Facility: HOSPITAL | Age: 54
LOS: 1 days | End: 2024-01-03
Payer: COMMERCIAL

## 2024-01-03 VITALS
RESPIRATION RATE: 18 BRPM | OXYGEN SATURATION: 98 % | SYSTOLIC BLOOD PRESSURE: 132 MMHG | DIASTOLIC BLOOD PRESSURE: 70 MMHG | HEART RATE: 75 BPM | HEIGHT: 62 IN | TEMPERATURE: 97.6 F | WEIGHT: 153 LBS | BODY MASS INDEX: 28.16 KG/M2

## 2024-01-03 DIAGNOSIS — R05.9 COUGH, UNSPECIFIED: ICD-10-CM

## 2024-01-03 DIAGNOSIS — K51.00 ULCERATIVE (CHRONIC) PANCOLITIS W/OUT COMPLICATIONS: ICD-10-CM

## 2024-01-03 DIAGNOSIS — Z87.448 PERSONAL HISTORY OF OTHER DISEASES OF URINARY SYSTEM: ICD-10-CM

## 2024-01-03 DIAGNOSIS — B33.8 OTHER SPECIFIED VIRAL DISEASES: ICD-10-CM

## 2024-01-03 PROCEDURE — 71046 X-RAY EXAM CHEST 2 VIEWS: CPT | Mod: 26

## 2024-01-03 PROCEDURE — 99213 OFFICE O/P EST LOW 20 MIN: CPT

## 2024-01-03 PROCEDURE — 71046 X-RAY EXAM CHEST 2 VIEWS: CPT

## 2024-01-03 NOTE — ASSESSMENT
[FreeTextEntry1] : 1-Cough/RSV- ptn is not in resp distress, however will do CXR as she had some abn BS over LLL on and off.  will not start any antibiotics ( ptn was on Z-pack). But will start ptn on Advair to help get the phlegm out. Encouraged to drink a lot of hot herbal teas, Vapor steam and take some vit C. Can take tylenol  for HA.  2- Ulcerative colitis -on Entyvio.  3-Hx of acute renal failure of unclear etiology- Last BUN/CR nl end of October.

## 2024-01-03 NOTE — PHYSICAL EXAM
[No Respiratory Distress] : no respiratory distress  [No Accessory Muscle Use] : no accessory muscle use [Normal] : no CVA or spinal tenderness [de-identified] : mostly clear on exam, some referred rhonchi heard over LLL

## 2024-01-03 NOTE — REVIEW OF SYSTEMS
[Fever] : no fever [Chills] : chills [Shortness Of Breath] : no shortness of breath [Cough] : cough [Negative] : Musculoskeletal [FreeTextEntry2] : sweaty on occasion [FreeTextEntry6] : hears gurgling

## 2024-01-03 NOTE — PLAN
[FreeTextEntry1] : FU as needed Note given to ptn for not being able to go to work for at least this week.

## 2024-01-03 NOTE — HEALTH RISK ASSESSMENT
[Yes] : Yes [Monthly or less (1 pt)] : Monthly or less (1 point) [1 or 2 (0 pts)] : 1 or 2 (0 points) [No falls in past year] : Patient reported no falls in the past year [1] : 1) Little interest or pleasure doing things for several days (1) [0] : 2) Feeling down, depressed, or hopeless: Not at all (0) [PHQ-2 Negative - No further assessment needed] : PHQ-2 Negative - No further assessment needed [Former] : Former [0-4] : 0-4 [> 15 Years] : > 15 Years [Patient reported mammogram was normal] : Patient reported mammogram was normal [Patient reported PAP Smear was normal] : Patient reported PAP Smear was normal [] :  [Fully functional (bathing, dressing, toileting, transferring, walking, feeding)] : Fully functional (bathing, dressing, toileting, transferring, walking, feeding) [Fully functional (using the telephone, shopping, preparing meals, housekeeping, doing laundry, using] : Fully functional and needs no help or supervision to perform IADLs (using the telephone, shopping, preparing meals, housekeeping, doing laundry, using transportation, managing medications and managing finances) [[id number]] : 1 [de-identified] : social smoker as a teenager [Change in mental status noted] : No change in mental status noted [MammogramDate] : 4/13/22 [PapSmearDate] : 9/20/21 [ColonoscopyDate] : 2022 [ColonoscopyComments] : DR. Harrington-1 yr recall-

## 2024-01-09 ENCOUNTER — TRANSCRIPTION ENCOUNTER (OUTPATIENT)
Age: 54
End: 2024-01-09

## 2024-02-01 ENCOUNTER — APPOINTMENT (OUTPATIENT)
Dept: OTOLARYNGOLOGY | Facility: CLINIC | Age: 54
End: 2024-02-01

## 2024-06-09 ENCOUNTER — NON-APPOINTMENT (OUTPATIENT)
Age: 54
End: 2024-06-09

## 2024-06-24 ENCOUNTER — APPOINTMENT (OUTPATIENT)
Dept: INTERNAL MEDICINE | Facility: CLINIC | Age: 54
End: 2024-06-24
Payer: COMMERCIAL

## 2024-06-24 VITALS
DIASTOLIC BLOOD PRESSURE: 68 MMHG | WEIGHT: 156 LBS | HEART RATE: 70 BPM | OXYGEN SATURATION: 99 % | TEMPERATURE: 97 F | SYSTOLIC BLOOD PRESSURE: 120 MMHG | HEIGHT: 62 IN | BODY MASS INDEX: 28.71 KG/M2

## 2024-06-24 DIAGNOSIS — E05.90 THYROTOXICOSIS, UNSPECIFIED W/OUT THYROTOXIC CRISIS OR STORM: ICD-10-CM

## 2024-06-24 DIAGNOSIS — R29.818 OTHER SYMPTOMS AND SIGNS INVOLVING THE NERVOUS SYSTEM: ICD-10-CM

## 2024-06-24 PROCEDURE — 99214 OFFICE O/P EST MOD 30 MIN: CPT

## 2024-06-24 PROCEDURE — G2211 COMPLEX E/M VISIT ADD ON: CPT | Mod: NC

## 2024-06-25 PROBLEM — E05.90 HYPERTHYROIDISM: Status: ACTIVE | Noted: 2024-06-24

## 2024-06-25 PROBLEM — R29.818 SUSPECTED SLEEP APNEA: Status: ACTIVE | Noted: 2024-06-24

## 2024-06-25 RX ORDER — FLUTICASONE PROPIONATE AND SALMETEROL 50; 250 UG/1; UG/1
250-50 POWDER RESPIRATORY (INHALATION) TWICE DAILY
Qty: 1 | Refills: 1 | Status: DISCONTINUED | COMMUNITY
Start: 2024-01-03 | End: 2024-06-25

## 2024-06-25 RX ORDER — FLUTICASONE PROPIONATE AND SALMETEROL XINAFOATE 115; 21 UG/1; UG/1
115-21 AEROSOL, METERED RESPIRATORY (INHALATION)
Qty: 1 | Refills: 0 | Status: DISCONTINUED | COMMUNITY
Start: 2024-01-05 | End: 2024-06-25

## 2024-06-25 RX ORDER — AZITHROMYCIN 250 MG/1
250 TABLET, FILM COATED ORAL
Qty: 1 | Refills: 0 | Status: DISCONTINUED | COMMUNITY
Start: 2023-12-29 | End: 2024-06-25

## 2024-06-28 LAB
ERYTHROCYTE [SEDIMENTATION RATE] IN BLOOD BY WESTERGREN METHOD: 2 MM/HR
T3 SERPL-MCNC: 250 NG/DL
THYROGLOB AB SERPL-ACNC: <20 IU/ML
THYROGLOB SERPL-MCNC: 19.8 NG/ML
TSH RECEPTOR AB: 3.95 IU/L

## 2024-07-09 ENCOUNTER — OUTPATIENT (OUTPATIENT)
Dept: OUTPATIENT SERVICES | Facility: HOSPITAL | Age: 54
LOS: 1 days | End: 2024-07-09
Payer: COMMERCIAL

## 2024-07-09 DIAGNOSIS — Z98.891 HISTORY OF UTERINE SCAR FROM PREVIOUS SURGERY: Chronic | ICD-10-CM

## 2024-07-09 DIAGNOSIS — G47.33 OBSTRUCTIVE SLEEP APNEA (ADULT) (PEDIATRIC): ICD-10-CM

## 2024-07-09 PROCEDURE — 95800 SLP STDY UNATTENDED: CPT

## 2024-07-09 PROCEDURE — 95800 SLP STDY UNATTENDED: CPT | Mod: 26

## 2024-07-12 ENCOUNTER — APPOINTMENT (OUTPATIENT)
Dept: INTERNAL MEDICINE | Facility: CLINIC | Age: 54
End: 2024-07-12

## 2024-07-16 ENCOUNTER — APPOINTMENT (OUTPATIENT)
Dept: INTERNAL MEDICINE | Facility: CLINIC | Age: 54
End: 2024-07-16
Payer: COMMERCIAL

## 2024-07-16 ENCOUNTER — NON-APPOINTMENT (OUTPATIENT)
Age: 54
End: 2024-07-16

## 2024-07-16 VITALS
DIASTOLIC BLOOD PRESSURE: 80 MMHG | SYSTOLIC BLOOD PRESSURE: 120 MMHG | BODY MASS INDEX: 28.71 KG/M2 | WEIGHT: 156 LBS | HEIGHT: 62 IN | OXYGEN SATURATION: 100 % | HEART RATE: 88 BPM | TEMPERATURE: 97.3 F

## 2024-07-16 DIAGNOSIS — Z00.00 ENCOUNTER FOR GENERAL ADULT MEDICAL EXAMINATION W/OUT ABNORMAL FINDINGS: ICD-10-CM

## 2024-07-16 DIAGNOSIS — E05.00 THYROTOXICOSIS WITH DIFFUSE GOITER W/OUT THYROTOXIC CRISIS OR STORM: ICD-10-CM

## 2024-07-16 DIAGNOSIS — K51.90 ULCERATIVE COLITIS, UNSPECIFIED, W/OUT COMPLICATIONS: ICD-10-CM

## 2024-07-16 DIAGNOSIS — Z12.39 ENCOUNTER FOR OTHER SCREENING FOR MALIGNANT NEOPLASM OF BREAST: ICD-10-CM

## 2024-07-16 PROCEDURE — 93000 ELECTROCARDIOGRAM COMPLETE: CPT

## 2024-07-16 PROCEDURE — 99396 PREV VISIT EST AGE 40-64: CPT

## 2024-07-18 PROBLEM — E05.00 GRAVES' DISEASE: Status: ACTIVE | Noted: 2024-07-18

## 2024-07-18 PROBLEM — Z12.39 BREAST CANCER SCREENING: Status: ACTIVE | Noted: 2024-07-16

## 2024-07-18 RX ORDER — CHLORHEXIDINE GLUCONATE 4 %
LIQUID (ML) TOPICAL
Refills: 0 | Status: ACTIVE | COMMUNITY

## 2024-08-12 ENCOUNTER — APPOINTMENT (OUTPATIENT)
Dept: PULMONOLOGY | Facility: CLINIC | Age: 54
End: 2024-08-12
Payer: COMMERCIAL

## 2024-08-12 VITALS
OXYGEN SATURATION: 98 % | HEART RATE: 75 BPM | HEIGHT: 62 IN | BODY MASS INDEX: 27.97 KG/M2 | DIASTOLIC BLOOD PRESSURE: 76 MMHG | WEIGHT: 152 LBS | RESPIRATION RATE: 16 BRPM | SYSTOLIC BLOOD PRESSURE: 142 MMHG

## 2024-08-12 DIAGNOSIS — E05.90 THYROTOXICOSIS, UNSPECIFIED W/OUT THYROTOXIC CRISIS OR STORM: ICD-10-CM

## 2024-08-12 DIAGNOSIS — N28.9 DISORDER OF KIDNEY AND URETER, UNSPECIFIED: ICD-10-CM

## 2024-08-12 DIAGNOSIS — K51.90 ULCERATIVE COLITIS, UNSPECIFIED, W/OUT COMPLICATIONS: ICD-10-CM

## 2024-08-12 DIAGNOSIS — G47.33 OBSTRUCTIVE SLEEP APNEA (ADULT) (PEDIATRIC): ICD-10-CM

## 2024-08-12 PROCEDURE — G2211 COMPLEX E/M VISIT ADD ON: CPT | Mod: NC

## 2024-08-12 PROCEDURE — 99214 OFFICE O/P EST MOD 30 MIN: CPT

## 2024-08-12 RX ORDER — METHIMAZOLE 5 MG/1
TABLET ORAL
Refills: 0 | Status: ACTIVE | COMMUNITY

## 2024-08-12 NOTE — HISTORY OF PRESENT ILLNESS
[Home] : home [Never] : never [TextBox_4] : Hx KEVIN.  Heavy snoring, EDS with ESS 9, nonrestorative sleep, witnessed apneas, gasping.  On klonidine prescribed by psychiatrist for anxiety. Also  on a med for ADHD.   Works as .  [TextBox_100] : 7/9/24 [TextBox_108] : 28.5 [TextBox_112] : 0.6 [ESS] : 9

## 2024-08-12 NOTE — PLAN
[TextEntry] : Reviewed treatments for sleep apnea - reviewed CPAP as first line therapy; reviewed OA, surgical options mainly Inspire. Will try CPAP. CPAP titration to be ordered. Reviewed use of CPAP. Different mask choices. Desensitization. Compliance goals. Psychiatry f/u.

## 2024-08-23 ENCOUNTER — OUTPATIENT (OUTPATIENT)
Dept: OUTPATIENT SERVICES | Facility: HOSPITAL | Age: 54
LOS: 1 days | End: 2024-08-23
Payer: COMMERCIAL

## 2024-08-23 DIAGNOSIS — Z98.891 HISTORY OF UTERINE SCAR FROM PREVIOUS SURGERY: Chronic | ICD-10-CM

## 2024-08-23 DIAGNOSIS — G47.33 OBSTRUCTIVE SLEEP APNEA (ADULT) (PEDIATRIC): ICD-10-CM

## 2024-08-23 PROCEDURE — 95811 POLYSOM 6/>YRS CPAP 4/> PARM: CPT | Mod: 26

## 2024-08-23 PROCEDURE — 95811 POLYSOM 6/>YRS CPAP 4/> PARM: CPT

## 2024-10-09 DIAGNOSIS — D84.821 IMMUNODEFICIENCY DUE TO DRUGS: ICD-10-CM

## 2024-10-09 DIAGNOSIS — Z79.899 IMMUNODEFICIENCY DUE TO DRUGS: ICD-10-CM

## 2024-10-09 RX ORDER — RESPIRATORY SYNCYTIAL VISUS VACCINE RECOMBINANT, ADJUVANTED 120MCG/0.5
120 KIT INTRAMUSCULAR
Qty: 1 | Refills: 0 | Status: ACTIVE | COMMUNITY
Start: 2024-10-09 | End: 1900-01-01

## 2024-10-21 ENCOUNTER — NON-APPOINTMENT (OUTPATIENT)
Age: 54
End: 2024-10-21

## 2024-10-24 NOTE — H&P ADULT - NSCORESITESY/N_GEN_A_CORE_RD
Yes
Patient Reported Weight (Optional - Include Units): 132
Anticipated Starting Dosage (Optional): 30mg BID
Ipledge Number (Optional): [id number]
Detail Level: Zone

## 2024-11-19 ENCOUNTER — APPOINTMENT (OUTPATIENT)
Dept: PULMONOLOGY | Facility: CLINIC | Age: 54
End: 2024-11-19
Payer: COMMERCIAL

## 2024-11-19 VITALS
BODY MASS INDEX: 28.52 KG/M2 | SYSTOLIC BLOOD PRESSURE: 134 MMHG | HEIGHT: 62 IN | OXYGEN SATURATION: 98 % | DIASTOLIC BLOOD PRESSURE: 84 MMHG | HEART RATE: 85 BPM | RESPIRATION RATE: 16 BRPM | WEIGHT: 155 LBS

## 2024-11-19 DIAGNOSIS — G47.33 OBSTRUCTIVE SLEEP APNEA (ADULT) (PEDIATRIC): ICD-10-CM

## 2024-11-19 PROCEDURE — 99214 OFFICE O/P EST MOD 30 MIN: CPT

## 2024-11-19 PROCEDURE — G2211 COMPLEX E/M VISIT ADD ON: CPT | Mod: NC

## 2024-12-04 ENCOUNTER — APPOINTMENT (OUTPATIENT)
Dept: INTERNAL MEDICINE | Facility: CLINIC | Age: 54
End: 2024-12-04

## 2024-12-04 ENCOUNTER — TRANSCRIPTION ENCOUNTER (OUTPATIENT)
Age: 54
End: 2024-12-04

## 2024-12-06 NOTE — ED ADULT NURSE NOTE - NS ED NURSE LEVEL OF CONSCIOUSNESS ORIENTATION
<--- Click to Launch ICDx for PreOp, PostOp and Procedure Oriented - self; Oriented - place; Oriented - time

## 2025-08-08 ENCOUNTER — APPOINTMENT (OUTPATIENT)
Dept: PULMONOLOGY | Facility: CLINIC | Age: 55
End: 2025-08-08
Payer: COMMERCIAL

## 2025-08-08 VITALS
HEIGHT: 61 IN | WEIGHT: 157 LBS | HEART RATE: 80 BPM | SYSTOLIC BLOOD PRESSURE: 134 MMHG | DIASTOLIC BLOOD PRESSURE: 84 MMHG | BODY MASS INDEX: 29.64 KG/M2 | OXYGEN SATURATION: 98 % | RESPIRATION RATE: 16 BRPM

## 2025-08-08 DIAGNOSIS — G47.33 OBSTRUCTIVE SLEEP APNEA (ADULT) (PEDIATRIC): ICD-10-CM

## 2025-08-08 PROCEDURE — 99214 OFFICE O/P EST MOD 30 MIN: CPT

## 2025-08-08 PROCEDURE — G2211 COMPLEX E/M VISIT ADD ON: CPT | Mod: NC
